# Patient Record
Sex: MALE | Race: WHITE | NOT HISPANIC OR LATINO | URBAN - METROPOLITAN AREA
[De-identification: names, ages, dates, MRNs, and addresses within clinical notes are randomized per-mention and may not be internally consistent; named-entity substitution may affect disease eponyms.]

---

## 2018-01-05 ENCOUNTER — HOSPITAL ENCOUNTER (EMERGENCY)
Facility: HOSPITAL | Age: 58
Discharge: HOME | End: 2018-01-06
Attending: EMERGENCY MEDICINE
Payer: COMMERCIAL

## 2018-01-05 ENCOUNTER — HOSPITAL ENCOUNTER (EMERGENCY)
Facility: HOSPITAL | Age: 58
Discharge: HOME | End: 2018-01-05
Attending: EMERGENCY MEDICINE
Payer: COMMERCIAL

## 2018-01-05 VITALS
SYSTOLIC BLOOD PRESSURE: 135 MMHG | HEART RATE: 114 BPM | DIASTOLIC BLOOD PRESSURE: 82 MMHG | BODY MASS INDEX: 29.4 KG/M2 | RESPIRATION RATE: 20 BRPM | OXYGEN SATURATION: 99 % | HEIGHT: 71 IN | WEIGHT: 210 LBS | TEMPERATURE: 99 F

## 2018-01-05 DIAGNOSIS — I47.10 SVT (SUPRAVENTRICULAR TACHYCARDIA): Primary | ICD-10-CM

## 2018-01-05 DIAGNOSIS — R07.9 CHEST PAIN: ICD-10-CM

## 2018-01-05 LAB
ALBUMIN SERPL BCP-MCNC: 4.1 G/DL
ALP SERPL-CCNC: 95 U/L
ALT SERPL W/O P-5'-P-CCNC: 26 U/L
ANION GAP SERPL CALC-SCNC: 13 MMOL/L
AST SERPL-CCNC: 19 U/L
BASOPHILS # BLD AUTO: 0.03 K/UL
BASOPHILS NFR BLD: 0.4 %
BILIRUB SERPL-MCNC: 0.5 MG/DL
BUN SERPL-MCNC: 14 MG/DL
CALCIUM SERPL-MCNC: 9.8 MG/DL
CHLORIDE SERPL-SCNC: 104 MMOL/L
CO2 SERPL-SCNC: 26 MMOL/L
CREAT SERPL-MCNC: 1.7 MG/DL
DIFFERENTIAL METHOD: ABNORMAL
EOSINOPHIL # BLD AUTO: 0 K/UL
EOSINOPHIL NFR BLD: 0.1 %
ERYTHROCYTE [DISTWIDTH] IN BLOOD BY AUTOMATED COUNT: 12.6 %
EST. GFR  (AFRICAN AMERICAN): 51.3 ML/MIN/1.73 M^2
EST. GFR  (NON AFRICAN AMERICAN): 44.4 ML/MIN/1.73 M^2
GLUCOSE SERPL-MCNC: 152 MG/DL
HCT VFR BLD AUTO: 50.7 %
HGB BLD-MCNC: 17 G/DL
IMM GRANULOCYTES # BLD AUTO: 0.03 K/UL
IMM GRANULOCYTES NFR BLD AUTO: 0.4 %
LYMPHOCYTES # BLD AUTO: 1.8 K/UL
LYMPHOCYTES NFR BLD: 24.4 %
MAGNESIUM SERPL-MCNC: 1.7 MG/DL
MCH RBC QN AUTO: 28.7 PG
MCHC RBC AUTO-ENTMCNC: 33.5 G/DL
MCV RBC AUTO: 86 FL
MONOCYTES # BLD AUTO: 0.6 K/UL
MONOCYTES NFR BLD: 8.6 %
NEUTROPHILS # BLD AUTO: 4.9 K/UL
NEUTROPHILS NFR BLD: 66.1 %
NRBC BLD-RTO: 0 /100 WBC
PLATELET # BLD AUTO: 245 K/UL
PMV BLD AUTO: 8.8 FL
POTASSIUM SERPL-SCNC: 4 MMOL/L
PROT SERPL-MCNC: 7.8 G/DL
RBC # BLD AUTO: 5.93 M/UL
SODIUM SERPL-SCNC: 143 MMOL/L
TROPONIN I SERPL DL<=0.01 NG/ML-MCNC: 0.01 NG/ML
WBC # BLD AUTO: 7.34 K/UL

## 2018-01-05 PROCEDURE — 99291 CRITICAL CARE FIRST HOUR: CPT | Mod: ,,, | Performed by: EMERGENCY MEDICINE

## 2018-01-05 PROCEDURE — 84484 ASSAY OF TROPONIN QUANT: CPT

## 2018-01-05 PROCEDURE — 93005 ELECTROCARDIOGRAM TRACING: CPT

## 2018-01-05 PROCEDURE — 83735 ASSAY OF MAGNESIUM: CPT

## 2018-01-05 PROCEDURE — 63600175 PHARM REV CODE 636 W HCPCS: Performed by: EMERGENCY MEDICINE

## 2018-01-05 PROCEDURE — 63600175 PHARM REV CODE 636 W HCPCS

## 2018-01-05 PROCEDURE — 85025 COMPLETE CBC W/AUTO DIFF WBC: CPT

## 2018-01-05 PROCEDURE — 96374 THER/PROPH/DIAG INJ IV PUSH: CPT

## 2018-01-05 PROCEDURE — 99283 EMERGENCY DEPT VISIT LOW MDM: CPT | Mod: 25,27

## 2018-01-05 PROCEDURE — 80053 COMPREHEN METABOLIC PANEL: CPT

## 2018-01-05 PROCEDURE — 25000003 PHARM REV CODE 250: Performed by: EMERGENCY MEDICINE

## 2018-01-05 PROCEDURE — 93010 ELECTROCARDIOGRAM REPORT: CPT | Mod: ,,, | Performed by: INTERNAL MEDICINE

## 2018-01-05 PROCEDURE — 99284 EMERGENCY DEPT VISIT MOD MDM: CPT | Mod: 25

## 2018-01-05 RX ORDER — ADENOSINE 3 MG/ML
6 INJECTION, SOLUTION INTRAVENOUS
Status: COMPLETED | OUTPATIENT
Start: 2018-01-05 | End: 2018-01-05

## 2018-01-05 RX ORDER — ADENOSINE 3 MG/ML
INJECTION, SOLUTION INTRAVENOUS
Status: DISCONTINUED
Start: 2018-01-05 | End: 2018-01-06 | Stop reason: HOSPADM

## 2018-01-05 RX ORDER — ADENOSINE 3 MG/ML
6 INJECTION, SOLUTION INTRAVENOUS
Status: DISCONTINUED | OUTPATIENT
Start: 2018-01-05 | End: 2018-01-05

## 2018-01-05 RX ORDER — ROSUVASTATIN CALCIUM 5 MG/1
5 TABLET, COATED ORAL DAILY
COMMUNITY

## 2018-01-05 RX ORDER — DILTIAZEM HYDROCHLORIDE 5 MG/ML
INJECTION INTRAVENOUS
Status: COMPLETED
Start: 2018-01-05 | End: 2018-01-05

## 2018-01-05 RX ORDER — DILTIAZEM HYDROCHLORIDE 120 MG/1
120 CAPSULE, EXTENDED RELEASE ORAL DAILY
COMMUNITY

## 2018-01-05 RX ORDER — ASPIRIN 81 MG/1
81 TABLET ORAL DAILY
Status: ON HOLD | COMMUNITY
End: 2018-01-12

## 2018-01-05 RX ORDER — ADENOSINE 3 MG/ML
INJECTION, SOLUTION INTRAVENOUS
Status: COMPLETED
Start: 2018-01-05 | End: 2018-01-05

## 2018-01-05 RX ADMIN — SODIUM CHLORIDE 1000 ML: 0.9 INJECTION, SOLUTION INTRAVENOUS at 10:01

## 2018-01-05 RX ADMIN — ADENOSINE 6 MG: 3 INJECTION, SOLUTION INTRAVENOUS at 03:01

## 2018-01-05 RX ADMIN — ADENOSINE 6 MG: 3 INJECTION, SOLUTION INTRAVENOUS at 09:01

## 2018-01-05 NOTE — ED PROVIDER NOTES
Encounter Date: 1/5/2018    SCRIBE #1 NOTE: I, Marshall Ramsay, am scribing for, and in the presence of,  Dr. Edgar. I have scribed the entire note.       History     Chief Complaint   Patient presents with    Tachycardia     Pt reports hx SVT.       Time patient was seen by the provider: 3:46PM      The patient is a 57 y.o. male with hx of: SVT, HTN and HLD  who presents to the ED with a complaint of palpitations since 2:00 PM today. When the pt came in ED, he had SVT at 230 bpm.Cardiology had offered him ablation in the past but he had declined.  The patient notes of having caffeine, chocolate, stress at home and insomnia as possible causes for the palpitations. He denies chest pain, fever, SOB, diaphoresis, nausea or vomiting. No other complaints.        The history is provided by the patient and medical records.     Review of patient's allergies indicates:   Allergen Reactions    Soma [carisoprodol]      No past medical history on file.  No past surgical history on file.  No family history on file.  Social History   Substance Use Topics    Smoking status: Not on file    Smokeless tobacco: Not on file    Alcohol use Not on file     Review of Systems   Constitutional: Negative for diaphoresis and fever.   HENT: Negative for sore throat.    Respiratory: Negative for shortness of breath.    Cardiovascular: Positive for palpitations. Negative for chest pain.   Gastrointestinal: Negative for nausea and vomiting.   Genitourinary: Negative for dysuria.   Musculoskeletal: Negative for back pain.   Skin: Negative for rash.   Neurological: Negative for weakness.   Hematological: Does not bruise/bleed easily.   Psychiatric/Behavioral: Positive for sleep disturbance.        Pt notes stress at home.       Physical Exam     Initial Vitals [01/05/18 1534]   BP Pulse Resp Temp SpO2   110/75 (!) 236 20 99.3 °F (37.4 °C) 100 %      MAP       86.67         Physical Exam    Constitutional: He appears well-developed and  well-nourished.   HENT:   Head: Normocephalic and atraumatic.   Eyes: EOM are normal. Pupils are equal, round, and reactive to light.   Neck: Normal range of motion. Neck supple. No JVD present.   Cardiovascular: Intact distal pulses. Exam reveals no gallop and no friction rub.    No murmur heard.  Tachycardia present. SVT on monitor.   Pulmonary/Chest: Breath sounds normal. No stridor. No respiratory distress. He has no wheezes. He has no rhonchi. He has no rales. He exhibits no tenderness.   Abdominal: Soft. There is no tenderness.   Musculoskeletal: Normal range of motion. He exhibits no edema or tenderness.   Lymphadenopathy:     He has no cervical adenopathy.   Neurological: He is alert and oriented to person, place, and time. He has normal strength. No cranial nerve deficit or sensory deficit.   Skin: Skin is warm and dry. Capillary refill takes less than 2 seconds. No rash noted.   Psychiatric: He has a normal mood and affect.         ED Course   Procedures  Labs Reviewed - No data to display  EKG Readings: (Independently Interpreted)   Rhythm: Sinus Tachycardia. Heart Rate: 115. Axis: Normal.   After Adenosine IV push:Normal interval; no ischemic changes.          Medical Decision Making:   History:   Old Medical Records: I decided to obtain old medical records.  Independently Interpreted Test(s):   I have ordered and independently interpreted EKG Reading(s) - see prior notes  Clinical Tests:   Lab Tests: Ordered and Reviewed  Radiological Study: Ordered and Reviewed  Medical Tests: Ordered and Reviewed  ED Management:  3:55 PM  Tried carotid massage: did not work.  Tried vagal manuever x2;did not work.    Gave him 6mg adenosine iv push, broke into sinus rhythm.            Scribe Attestation:   Scribe #1: I performed the above scribed service and the documentation accurately describes the services I performed. I attest to the accuracy of the note.    Attending Attestation:         Attending Critical Care:    Critical Care Times:   ==============================================================  · Total Critical Care Time - exclusive of procedural time: 35 minutes.  ==============================================================              ED Course      Clinical Impression:   Diagnoses of Chest pain and Chest pain were pertinent to this visit.    Disposition:   Disposition: Discharged  57-year-old white male with history of SVT seen by cardiology was told to have an ablation refused it has had upper respiratory cold over the last few days has been using nasal decongestant sprays and decongestant cough medicines drinking caffeine alcohol being chocolate been under stress and not sleeping well certainly palpitations this morning presented to the ER with palpitations heart rate of 236 SVT blood pressure 110/70 500% on room air respiratory rate of 18 temperature 99.3 lungs clear to auscultation bilaterally heart tachycardic SVT on the monitor.  Carotid massage was attempted no effect vagal maneuvers were attempted twice no effect patient was placed on 2 L O2 nasal cannula and IV was started blood was drawn for labs placed on cardiac monitor and O2 sat monitor patient is given 6 mg of adenosine IV push which converted him immediately to a sinus rhythm 12-lead EKG was done which showed no acute ischemic changes with a sinus tachycardia at 105 CBC normal compress metabolic panel normal magnesium normal chest x-ray process per radiologist N O'Xiomy in the lab was 1421.7.  Patient was observed for a period time in the ER no chest pain no palpitations maintained in a sinus rhythm throughout fosinopril this time diagnosis SVT asked him to stop the nasal decongestant spray.  The cough syrup with a decongestant stop caffeine stop alcohol stop chocolate follow-up with cardiology and his primary care doctor as soon as possible return to the ER for chest pain shortness of breath nausea vomiting diaphoresis or  palpitations                        Huan Edgar MD  01/05/18 7960

## 2018-01-05 NOTE — ED TRIAGE NOTES
Patient has a history of SVT.  Patient states he felt palpitations. Denies chest pain. Started at 2pm

## 2018-01-06 VITALS
HEART RATE: 97 BPM | DIASTOLIC BLOOD PRESSURE: 70 MMHG | SYSTOLIC BLOOD PRESSURE: 115 MMHG | OXYGEN SATURATION: 97 % | TEMPERATURE: 100 F | RESPIRATION RATE: 12 BRPM | HEIGHT: 71 IN | BODY MASS INDEX: 29.4 KG/M2 | WEIGHT: 210 LBS

## 2018-01-06 PROCEDURE — 25000003 PHARM REV CODE 250: Performed by: EMERGENCY MEDICINE

## 2018-01-06 RX ORDER — METOPROLOL SUCCINATE 25 MG/1
12.5 TABLET, EXTENDED RELEASE ORAL DAILY
Qty: 15 TABLET | Refills: 0 | Status: ON HOLD | OUTPATIENT
Start: 2018-01-06 | End: 2018-01-12 | Stop reason: HOSPADM

## 2018-01-06 RX ADMIN — METOPROLOL SUCCINATE 12.5 MG: 25 TABLET, EXTENDED RELEASE ORAL at 01:01

## 2018-01-10 ENCOUNTER — HOSPITAL ENCOUNTER (OUTPATIENT)
Facility: HOSPITAL | Age: 58
Discharge: HOME | End: 2018-01-12
Attending: EMERGENCY MEDICINE | Admitting: HOSPITALIST
Payer: COMMERCIAL

## 2018-01-10 DIAGNOSIS — I10 ESSENTIAL HYPERTENSION: ICD-10-CM

## 2018-01-10 DIAGNOSIS — N17.9 ACUTE RENAL FAILURE, UNSPECIFIED ACUTE RENAL FAILURE TYPE: ICD-10-CM

## 2018-01-10 DIAGNOSIS — N17.9 ACUTE KIDNEY FAILURE: ICD-10-CM

## 2018-01-10 DIAGNOSIS — R79.89 ELEVATED TROPONIN I LEVEL: ICD-10-CM

## 2018-01-10 DIAGNOSIS — I47.10 SVT (SUPRAVENTRICULAR TACHYCARDIA): Primary | ICD-10-CM

## 2018-01-10 DIAGNOSIS — E78.2 MIXED HYPERLIPIDEMIA: Chronic | ICD-10-CM

## 2018-01-10 DIAGNOSIS — R00.0 TACHYCARDIA: ICD-10-CM

## 2018-01-10 DIAGNOSIS — F41.1 GENERALIZED ANXIETY DISORDER: Chronic | ICD-10-CM

## 2018-01-10 LAB
ALBUMIN SERPL BCP-MCNC: 4.1 G/DL
ALP SERPL-CCNC: 111 U/L
ALT SERPL W/O P-5'-P-CCNC: 64 U/L
ANION GAP SERPL CALC-SCNC: 12 MMOL/L
AST SERPL-CCNC: 32 U/L
BASOPHILS # BLD AUTO: 0.06 K/UL
BASOPHILS NFR BLD: 0.7 %
BILIRUB SERPL-MCNC: 0.6 MG/DL
BUN SERPL-MCNC: 20 MG/DL
CALCIUM SERPL-MCNC: 10.1 MG/DL
CHLORIDE SERPL-SCNC: 102 MMOL/L
CO2 SERPL-SCNC: 29 MMOL/L
CREAT SERPL-MCNC: 1.5 MG/DL
DIFFERENTIAL METHOD: ABNORMAL
EOSINOPHIL # BLD AUTO: 0.2 K/UL
EOSINOPHIL NFR BLD: 2.1 %
ERYTHROCYTE [DISTWIDTH] IN BLOOD BY AUTOMATED COUNT: 12.1 %
EST. GFR  (AFRICAN AMERICAN): 59.7 ML/MIN/1.73 M^2
EST. GFR  (NON AFRICAN AMERICAN): 51.7 ML/MIN/1.73 M^2
GLUCOSE SERPL-MCNC: 185 MG/DL
HCT VFR BLD AUTO: 52.7 %
HGB BLD-MCNC: 17.6 G/DL
IMM GRANULOCYTES # BLD AUTO: 0.04 K/UL
IMM GRANULOCYTES NFR BLD AUTO: 0.4 %
INR PPP: 1.1
LYMPHOCYTES # BLD AUTO: 3.3 K/UL
LYMPHOCYTES NFR BLD: 36.3 %
MAGNESIUM SERPL-MCNC: 2.1 MG/DL
MCH RBC QN AUTO: 27.8 PG
MCHC RBC AUTO-ENTMCNC: 33.4 G/DL
MCV RBC AUTO: 83 FL
MONOCYTES # BLD AUTO: 0.6 K/UL
MONOCYTES NFR BLD: 6.7 %
NEUTROPHILS # BLD AUTO: 4.9 K/UL
NEUTROPHILS NFR BLD: 53.8 %
NRBC BLD-RTO: 0 /100 WBC
PLATELET # BLD AUTO: 328 K/UL
PMV BLD AUTO: 9.3 FL
POTASSIUM SERPL-SCNC: 3.8 MMOL/L
PROT SERPL-MCNC: 8 G/DL
PROTHROMBIN TIME: 11.4 SEC
RBC # BLD AUTO: 6.34 M/UL
SODIUM SERPL-SCNC: 143 MMOL/L
TROPONIN I SERPL DL<=0.01 NG/ML-MCNC: 0.09 NG/ML
TROPONIN I SERPL DL<=0.01 NG/ML-MCNC: 0.09 NG/ML
TSH SERPL DL<=0.005 MIU/L-ACNC: 2.55 UIU/ML
WBC # BLD AUTO: 9.1 K/UL

## 2018-01-10 PROCEDURE — 96361 HYDRATE IV INFUSION ADD-ON: CPT

## 2018-01-10 PROCEDURE — 85610 PROTHROMBIN TIME: CPT

## 2018-01-10 PROCEDURE — 99285 EMERGENCY DEPT VISIT HI MDM: CPT | Mod: 25

## 2018-01-10 PROCEDURE — 99244 OFF/OP CNSLTJ NEW/EST MOD 40: CPT | Mod: ,,, | Performed by: INTERNAL MEDICINE

## 2018-01-10 PROCEDURE — 93010 ELECTROCARDIOGRAM REPORT: CPT | Mod: 76,,, | Performed by: INTERNAL MEDICINE

## 2018-01-10 PROCEDURE — 25000003 PHARM REV CODE 250: Performed by: EMERGENCY MEDICINE

## 2018-01-10 PROCEDURE — 84484 ASSAY OF TROPONIN QUANT: CPT

## 2018-01-10 PROCEDURE — 83735 ASSAY OF MAGNESIUM: CPT

## 2018-01-10 PROCEDURE — 84484 ASSAY OF TROPONIN QUANT: CPT | Mod: 91

## 2018-01-10 PROCEDURE — 80053 COMPREHEN METABOLIC PANEL: CPT

## 2018-01-10 PROCEDURE — 96375 TX/PRO/DX INJ NEW DRUG ADDON: CPT

## 2018-01-10 PROCEDURE — 93005 ELECTROCARDIOGRAM TRACING: CPT

## 2018-01-10 PROCEDURE — 99285 EMERGENCY DEPT VISIT HI MDM: CPT | Mod: ,,, | Performed by: EMERGENCY MEDICINE

## 2018-01-10 PROCEDURE — 84443 ASSAY THYROID STIM HORMONE: CPT

## 2018-01-10 PROCEDURE — 99220 PR INITIAL OBSERVATION CARE,LEVL III: CPT | Mod: ,,, | Performed by: NURSE PRACTITIONER

## 2018-01-10 PROCEDURE — 63600175 PHARM REV CODE 636 W HCPCS: Performed by: EMERGENCY MEDICINE

## 2018-01-10 PROCEDURE — 85025 COMPLETE CBC W/AUTO DIFF WBC: CPT

## 2018-01-10 PROCEDURE — 25000003 PHARM REV CODE 250: Performed by: NURSE PRACTITIONER

## 2018-01-10 PROCEDURE — 63600175 PHARM REV CODE 636 W HCPCS

## 2018-01-10 PROCEDURE — 96365 THER/PROPH/DIAG IV INF INIT: CPT

## 2018-01-10 PROCEDURE — 96366 THER/PROPH/DIAG IV INF ADDON: CPT

## 2018-01-10 PROCEDURE — G0378 HOSPITAL OBSERVATION PER HR: HCPCS

## 2018-01-10 PROCEDURE — 93010 ELECTROCARDIOGRAM REPORT: CPT | Mod: ,,, | Performed by: INTERNAL MEDICINE

## 2018-01-10 RX ORDER — ROSUVASTATIN CALCIUM 5 MG/1
5 TABLET, COATED ORAL DAILY
Status: DISCONTINUED | OUTPATIENT
Start: 2018-01-11 | End: 2018-01-12 | Stop reason: HOSPADM

## 2018-01-10 RX ORDER — CHOLECALCIFEROL (VITAMIN D3) 25 MCG
5000 TABLET ORAL EVERY OTHER DAY
Status: DISCONTINUED | OUTPATIENT
Start: 2018-01-11 | End: 2018-01-12 | Stop reason: HOSPADM

## 2018-01-10 RX ORDER — CHOLECALCIFEROL (VITAMIN D3) 25 MCG
5000 TABLET ORAL EVERY OTHER DAY
COMMUNITY

## 2018-01-10 RX ORDER — ADENOSINE 3 MG/ML
INJECTION, SOLUTION INTRAVENOUS
Status: DISPENSED
Start: 2018-01-10 | End: 2018-01-10

## 2018-01-10 RX ORDER — ASPIRIN 81 MG/1
81 TABLET ORAL DAILY
Status: DISCONTINUED | OUTPATIENT
Start: 2018-01-11 | End: 2018-01-12 | Stop reason: HOSPADM

## 2018-01-10 RX ORDER — ASPIRIN 325 MG
325 TABLET ORAL
Status: COMPLETED | OUTPATIENT
Start: 2018-01-10 | End: 2018-01-10

## 2018-01-10 RX ORDER — PROMETHAZINE HYDROCHLORIDE 25 MG/1
25 TABLET ORAL DAILY PRN
COMMUNITY

## 2018-01-10 RX ORDER — ADENOSINE 3 MG/ML
12 INJECTION, SOLUTION INTRAVENOUS
Status: COMPLETED | OUTPATIENT
Start: 2018-01-10 | End: 2018-01-10

## 2018-01-10 RX ORDER — ONDANSETRON 2 MG/ML
4 INJECTION INTRAMUSCULAR; INTRAVENOUS
Status: COMPLETED | OUTPATIENT
Start: 2018-01-10 | End: 2018-01-10

## 2018-01-10 RX ORDER — SIMETHICONE 80 MG
80 TABLET,CHEWABLE ORAL EVERY 6 HOURS PRN
Status: DISCONTINUED | OUTPATIENT
Start: 2018-01-10 | End: 2018-01-12 | Stop reason: HOSPADM

## 2018-01-10 RX ORDER — ONDANSETRON 2 MG/ML
INJECTION INTRAMUSCULAR; INTRAVENOUS
Status: DISPENSED
Start: 2018-01-10 | End: 2018-01-10

## 2018-01-10 RX ORDER — DIAZEPAM 5 MG/1
2.5 TABLET ORAL DAILY PRN
COMMUNITY

## 2018-01-10 RX ORDER — ADENOSINE 3 MG/ML
6 INJECTION, SOLUTION INTRAVENOUS
Status: DISCONTINUED | OUTPATIENT
Start: 2018-01-10 | End: 2018-01-10

## 2018-01-10 RX ORDER — METOPROLOL TARTRATE 25 MG/1
25 TABLET, FILM COATED ORAL 2 TIMES DAILY
Status: DISCONTINUED | OUTPATIENT
Start: 2018-01-10 | End: 2018-01-10

## 2018-01-10 RX ORDER — ADENOSINE 3 MG/ML
INJECTION, SOLUTION INTRAVENOUS
Status: COMPLETED
Start: 2018-01-10 | End: 2018-01-10

## 2018-01-10 RX ORDER — SODIUM CHLORIDE 9 MG/ML
1000 INJECTION, SOLUTION INTRAVENOUS
Status: COMPLETED | OUTPATIENT
Start: 2018-01-10 | End: 2018-01-10

## 2018-01-10 RX ORDER — SIMETHICONE 125 MG
125 TABLET,CHEWABLE ORAL EVERY 6 HOURS PRN
COMMUNITY

## 2018-01-10 RX ORDER — ACETAMINOPHEN 325 MG/1
650 TABLET ORAL EVERY 4 HOURS PRN
Status: DISCONTINUED | OUTPATIENT
Start: 2018-01-10 | End: 2018-01-12 | Stop reason: HOSPADM

## 2018-01-10 RX ORDER — METOPROLOL TARTRATE 25 MG/1
12.5 TABLET ORAL
Status: COMPLETED | OUTPATIENT
Start: 2018-01-10 | End: 2018-01-10

## 2018-01-10 RX ORDER — MAGNESIUM SULFATE HEPTAHYDRATE 40 MG/ML
2 INJECTION, SOLUTION INTRAVENOUS ONCE
Status: COMPLETED | OUTPATIENT
Start: 2018-01-10 | End: 2018-01-10

## 2018-01-10 RX ORDER — DILTIAZEM HYDROCHLORIDE 120 MG/1
120 CAPSULE, COATED, EXTENDED RELEASE ORAL
Status: COMPLETED | OUTPATIENT
Start: 2018-01-10 | End: 2018-01-10

## 2018-01-10 RX ORDER — DILTIAZEM HYDROCHLORIDE 120 MG/1
120 CAPSULE, COATED, EXTENDED RELEASE ORAL DAILY
Status: DISCONTINUED | OUTPATIENT
Start: 2018-01-11 | End: 2018-01-12 | Stop reason: HOSPADM

## 2018-01-10 RX ORDER — CETIRIZINE HYDROCHLORIDE 5 MG/1
10 TABLET ORAL DAILY
Status: DISCONTINUED | OUTPATIENT
Start: 2018-01-11 | End: 2018-01-12 | Stop reason: HOSPADM

## 2018-01-10 RX ORDER — ADENOSINE 3 MG/ML
12 INJECTION, SOLUTION INTRAVENOUS
Status: DISCONTINUED | OUTPATIENT
Start: 2018-01-10 | End: 2018-01-10

## 2018-01-10 RX ORDER — METOPROLOL SUCCINATE 25 MG/1
25 TABLET, EXTENDED RELEASE ORAL 2 TIMES DAILY
Status: DISCONTINUED | OUTPATIENT
Start: 2018-01-10 | End: 2018-01-11

## 2018-01-10 RX ORDER — SODIUM CHLORIDE 0.9 % (FLUSH) 0.9 %
5 SYRINGE (ML) INJECTION
Status: DISCONTINUED | OUTPATIENT
Start: 2018-01-10 | End: 2018-01-12 | Stop reason: HOSPADM

## 2018-01-10 RX ORDER — ADENOSINE 3 MG/ML
6 INJECTION, SOLUTION INTRAVENOUS
Status: COMPLETED | OUTPATIENT
Start: 2018-01-10 | End: 2018-01-10

## 2018-01-10 RX ADMIN — MAGNESIUM SULFATE IN WATER 2 G: 40 INJECTION, SOLUTION INTRAVENOUS at 02:01

## 2018-01-10 RX ADMIN — SODIUM CHLORIDE 1000 ML: 0.9 INJECTION, SOLUTION INTRAVENOUS at 11:01

## 2018-01-10 RX ADMIN — ADENOSINE 6 MG: 3 INJECTION, SOLUTION INTRAVENOUS at 10:01

## 2018-01-10 RX ADMIN — ASPIRIN 325 MG ORAL TABLET 325 MG: 325 PILL ORAL at 11:01

## 2018-01-10 RX ADMIN — METOPROLOL SUCCINATE 25 MG: 25 TABLET, EXTENDED RELEASE ORAL at 08:01

## 2018-01-10 RX ADMIN — Medication 12.5 MG: at 11:01

## 2018-01-10 RX ADMIN — DILTIAZEM HYDROCHLORIDE 120 MG: 120 CAPSULE, COATED, EXTENDED RELEASE ORAL at 11:01

## 2018-01-10 RX ADMIN — ONDANSETRON 4 MG: 2 INJECTION INTRAMUSCULAR; INTRAVENOUS at 11:01

## 2018-01-10 RX ADMIN — ADENOSINE 12 MG: 3 INJECTION, SOLUTION INTRAVENOUS at 10:01

## 2018-01-10 NOTE — ED TRIAGE NOTES
Patient received with complaint of palpitations after waking this morning at 0900.  Denies nausea, vomiting, SOB.  States he just felt his heart racing.  Some dizziness at times.    No LDA's in place on arrival to department.    Family present.    Pain:  Denies pain.      Psychosocial:  Patient is calm and cooperative.  Patients insight and judgement are appropriate to situation.  Appears clean, well maintained, with clothing appropriate to environment.  No evidence of delusions, hallucinations, or psychosis.    Neuro:  Eyes open spontaneously.  Awake, alert, oriented x 4.  Speech clear and appropriate.  Tolerating saliva secretions well.  Able to follow commands, demonstrating ability to actively and appropriately communicate within context of current conversation.  Symmetrical facial muscles.  Moving all extremities well with no noted weakness.  Adequate muscle tone present.    Movement in purposeful.       Airway:  Bilateral chest rise and fall.  RR regular and non-labored.  No crepitus or subcutaneous emphysema noted on palpation.      Circulatory:  Skin warm, dry, and pink.  Apical and radial pulses strong and regular.  Capillary refill/skin blanching less than 3 seconds to distal of 4 extremities.  SVT on the CM with rate of 200.      Abdomen:  Abdomen soft and non-distended.        Urinary:  Patient reports routine urination without pain, frequency, or urgency.  Voids independently.  Reports urine appears chayo/yellow in color.    Extremities:  No redness, heat, swelling, deformity, or pain.    Skin:  Intact with no bruising/discolorations noted.

## 2018-01-10 NOTE — ED NOTES
Bed: 04  Expected date: 1/10/18  Expected time: 9:15 AM  Means of arrival:   Comments:  Head Bleed

## 2018-01-10 NOTE — SUBJECTIVE & OBJECTIVE
Past Medical History:   Diagnosis Date    Anxiety     Hyperlipidemia     Hypertension     SVT (supraventricular tachycardia)        History reviewed. No pertinent surgical history.    Review of patient's allergies indicates:   Allergen Reactions    Soma [carisoprodol]        No current facility-administered medications on file prior to encounter.      Current Outpatient Prescriptions on File Prior to Encounter   Medication Sig    aspirin (ECOTRIN) 81 MG EC tablet Take 81 mg by mouth once daily.    diltiaZEM (DILACOR XR) 120 MG CDCR Take 120 mg by mouth once daily.    metoprolol succinate (TOPROL XL) 25 MG 24 hr tablet Take 0.5 tablets (12.5 mg total) by mouth once daily. (Patient taking differently: Take 25 mg by mouth 2 (two) times daily. )    rosuvastatin (CRESTOR) 5 MG tablet Take 5 mg by mouth once daily. Taking every other day     Family History     None        Social History Main Topics    Smoking status: Never Smoker    Smokeless tobacco: Not on file    Alcohol use No    Drug use: No    Sexual activity: Not on file     Review of Systems   Constitution: Positive for malaise/fatigue. Negative for chills and fever.   HENT: Negative for hoarse voice and sore throat.    Eyes: Negative for pain and redness.   Cardiovascular: Positive for irregular heartbeat and palpitations. Negative for chest pain, dyspnea on exertion, leg swelling and orthopnea.   Respiratory: Negative for cough and shortness of breath.    Endocrine: Negative for polydipsia and polyuria.   Hematologic/Lymphatic: Negative for bleeding problem. Does not bruise/bleed easily.   Skin: Negative for flushing and itching.   Musculoskeletal: Negative for joint pain and joint swelling.   Gastrointestinal: Negative for hematemesis, hematochezia and melena.   Genitourinary: Negative for dysuria and hematuria.   Neurological: Negative for light-headedness and loss of balance.     Objective:     Vital Signs (Most Recent):  Temp: 98 °F (36.7 °C)  (01/10/18 1043)  Pulse: 76 (01/10/18 1547)  Resp: 17 (01/10/18 1100)  BP: 137/73 (01/10/18 1547)  SpO2: 99 % (01/10/18 1547) Vital Signs (24h Range):  Temp:  [98 °F (36.7 °C)] 98 °F (36.7 °C)  Pulse:  [] 76  Resp:  [17-21] 17  SpO2:  [98 %-100 %] 99 %  BP: ()/() 137/73     Weight: 90.7 kg (200 lb)  Body mass index is 27.12 kg/m².    SpO2: 99 %       No intake or output data in the 24 hours ending 01/10/18 1619    Lines/Drains/Airways     Peripheral Intravenous Line                 Peripheral IV - Single Lumen 01/10/18 1055 Left Antecubital less than 1 day                Physical Exam   Constitutional: He is oriented to person, place, and time. He appears well-developed and well-nourished.   HENT:   Head: Normocephalic and atraumatic.   Mouth/Throat: No oropharyngeal exudate.   Eyes: EOM are normal. Pupils are equal, round, and reactive to light.   Neck: Normal range of motion. Neck supple. No JVD present.   Cardiovascular: Normal rate and regular rhythm.  Exam reveals no friction rub.    No murmur heard.  Pulmonary/Chest: Effort normal and breath sounds normal. No respiratory distress.   Abdominal: Soft. Bowel sounds are normal. He exhibits no distension.   Musculoskeletal: Normal range of motion. He exhibits no edema.   Neurological: He is alert and oriented to person, place, and time.   Skin: No rash noted.       Significant Labs: All pertinent lab results from the last 24 hours have been reviewed.    Significant Imaging: EKG: short RP tahcycardia likley Typical AVNRT

## 2018-01-10 NOTE — ED PROVIDER NOTES
Encounter Date: 1/10/2018    SCRIBE #1 NOTE: I, Lizet Mary, am scribing for, and in the presence of, Dr. Winslow.       History     Chief Complaint   Patient presents with    Tachycardia     hx svt     Time patient was seen by the provider: 10:54 AM      This is a 57 y.o. male with co-morbidities including SVT, HTN, HLD and anxiety who presents to the ED with a chief complaint of acute onset of tachycardia since at 9:00 AM this morning. Heart rate is 200 at the current time. Patient states the current episodes feel similar to past episodes of SVT. Additionally endorses shortness of breath, dizziness, pallor. Denies chest pain. He reportedly tried the Valsalva Maneuver at home, no relief.       The history is provided by the patient and medical records.     Review of patient's allergies indicates:   Allergen Reactions    Soma [carisoprodol]      Past Medical History:   Diagnosis Date    Anxiety     Hyperlipidemia     Hypertension     SVT (supraventricular tachycardia)      History reviewed. No pertinent surgical history.  History reviewed. No pertinent family history.  Social History   Substance Use Topics    Smoking status: Never Smoker    Smokeless tobacco: Not on file    Alcohol use No     Review of Systems   Constitutional: Negative for chills, diaphoresis and fever.   HENT: Negative for nosebleeds.    Eyes: Negative for photophobia and visual disturbance.   Respiratory: Positive for shortness of breath. Negative for cough.    Cardiovascular: Positive for palpitations. Negative for chest pain.   Gastrointestinal: Negative for abdominal pain, diarrhea, nausea and vomiting.   Musculoskeletal: Negative for back pain and neck pain.   Skin: Positive for pallor. Negative for rash.   Neurological: Positive for dizziness.   Psychiatric/Behavioral: Negative for behavioral problems.       Physical Exam     Initial Vitals [01/10/18 1043]   BP Pulse Resp Temp SpO2   (!) 201/130 (!) 200 18 98 °F (36.7 °C) 99 %       MAP       153.67         Physical Exam    Nursing note and vitals reviewed.  Constitutional: He appears well-developed and well-nourished. He is not diaphoretic. No distress.   HENT:   Head: Normocephalic and atraumatic.   Eyes: Conjunctivae and EOM are normal. Pupils are equal, round, and reactive to light.   Neck: Normal range of motion. Neck supple. No JVD present.   Cardiovascular: Regular rhythm. Tachycardia present.    No murmur heard.  Pulmonary/Chest: Breath sounds normal. No respiratory distress. He has no wheezes. He has no rhonchi. He has no rales.   Abdominal: Soft. Bowel sounds are normal. He exhibits no distension and no mass. There is no tenderness. There is no rebound and no guarding.   Musculoskeletal: Normal range of motion. He exhibits no edema or tenderness.   Neurological: He is alert and oriented to person, place, and time. He has normal strength. No cranial nerve deficit or sensory deficit.   Skin: Skin is warm and dry. No rash noted. There is pallor.   Psychiatric: He has a normal mood and affect.         ED Course   Procedures  Labs Reviewed   COMPREHENSIVE METABOLIC PANEL - Abnormal; Notable for the following:        Result Value    Glucose 185 (*)     Creatinine 1.5 (*)     ALT 64 (*)     eGFR if  59.7 (*)     eGFR if non  51.7 (*)     All other components within normal limits   TROPONIN I - Abnormal; Notable for the following:     Troponin I 0.085 (*)     All other components within normal limits   CBC W/ AUTO DIFFERENTIAL - Abnormal; Notable for the following:     RBC 6.34 (*)     All other components within normal limits   PROTIME-INR   MAGNESIUM   TSH   MAGNESIUM    Narrative:     [facility] #[id number]; TSH # [id number] PER MARICRUZ BARAHONA MD 15:02    01/10/2018    TSH    Narrative:     [facility] #[id number]; TSH # [id number] PER MARICRUZ BARAHONA MD 15:02    01/10/2018    TROPONIN I        X-Ray Chest 1 View   Final Result      No acute disease  identified in this 57-year-old man with chest pain.         Electronically signed by: Shelley Cameron MD   Date:     01/10/18   Time:    13:59             Medical Decision Making:   History:   Old Medical Records: I decided to obtain old medical records.  Clinical Tests:   Lab Tests: Ordered and Reviewed  Radiological Study: Ordered and Reviewed  Medical Tests: Ordered and Reviewed  ED Management:  Patient experienced brief episode of ventricular tachycardia. Denied chest pain, felt palpitations. Resolved spontaneously. Cardiology consulted, informed.     Discussion with Cardiology. Patient will be admitted to observation. There is consideration of ablation procedure. Patient will disc with insurance. Currently, he's asymptomatic and has no complaints.   Other:   I have discussed this case with another health care provider.            Scribe Attestation:   Scribe #1: I performed the above scribed service and the documentation accurately describes the services I performed. I attest to the accuracy of the note.            ED Course      Clinical Impression:   The primary encounter diagnosis was SVT (supraventricular tachycardia). Diagnoses of Tachycardia, Elevated troponin I level, and SVT (supraventricular tachycardia) were also pertinent to this visit.    Disposition:   Disposition: Placed in Observation                        Edwardo Winslow MD  01/10/18 1629       Edwardo Winslow MD  01/11/18 0832

## 2018-01-10 NOTE — HPI
57 y.o. male with co-morbidities including SVT, HTN, HLD and anxiety who presents to the ED with a chief complaint of acute onset of tachycardia since at 9:00 AM this morning. Heart rate is 200 at the current time. Patient states the current episodes feel similar to past episodes of SVT. Additionally endorses shortness of breath, dizziness, pallor. Denies chest pain. He reportedly tried the Valsalva Maneuver at home, no relief.

## 2018-01-10 NOTE — HPI
58 y/o male with hx of SVT for 3 years occuring about once a month(lives in [place] and offered ablation in the past) here visiting family presents with 3rd episode of SVT since 1/5. On 1/5 and 1/6 he came to ED with SVT in 200's dizziness and hypotension and converted with 6mg Adenosine each time. Sent out on 12.5mg Toprol BID in addition to Home dilt 120mg XR he has been taking for some time. He was having URI symptoms and taking cold medications which were thought to have precipitated event so he stopped taking them but had another episode today. 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 SBP was in 70's with HR in 200's and he spilled some troponin. No CP. Very active and doesn't get CP or SOB when playing soccer or softball.

## 2018-01-10 NOTE — CONSULTS
[facility]  Cardiology  Consult Note    Patient Name: Reyna Drummond  MRN: [id number]  Admission Date: 1/10/2018  Hospital Length of Stay: 0 days  Code Status: Full Code   Attending Provider: Ella Nobles MD   Consulting Provider: David Scott MD  Primary Care Physician: No primary care provider on file.  Principal Problem:<principal problem not specified>    Patient information was obtained from patient and ER records.     Inpatient consult to Electrophysiology  Consult performed by: DAVID SCOTT  Consult ordered by: MARLENE GHOTRA        Subjective:     Chief Complaint:  Tachycardia     HPI:   58 y/o male with hx of SVT for 3 years occuring about once a month(lives in [place] and offered ablation in the past) here visiting family presents with 3rd episode of SVT since 1/5. On 1/5 and 1/6 he came to ED with SVT in 200's dizziness and hypotension and converted with 6mg Adenosine each time. Sent out on 12.5mg Toprol BID in addition to Home dilt 120mg XR he has been taking for some time. He was having URI symptoms and taking cold medications which were thought to have precipitated event so he stopped taking them but had another episode today. 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 SBP was in 70's with HR in 200's and he spilled some troponin. No CP. Very active and doesn't get CP or SOB when playing soccer or softball.    Past Medical History:   Diagnosis Date    Anxiety     Hyperlipidemia     Hypertension     SVT (supraventricular tachycardia)        History reviewed. No pertinent surgical history.    Review of patient's allergies indicates:   Allergen Reactions    Soma [carisoprodol]        No current facility-administered medications on file prior to encounter.      Current Outpatient Prescriptions on File Prior to Encounter   Medication Sig    aspirin (ECOTRIN) 81 MG EC tablet Take 81 mg by mouth once daily.    diltiaZEM  (DILACOR XR) 120 MG CDCR Take 120 mg by mouth once daily.    metoprolol succinate (TOPROL XL) 25 MG 24 hr tablet Take 0.5 tablets (12.5 mg total) by mouth once daily. (Patient taking differently: Take 25 mg by mouth 2 (two) times daily. )    rosuvastatin (CRESTOR) 5 MG tablet Take 5 mg by mouth once daily. Taking every other day     Family History     None        Social History Main Topics    Smoking status: Never Smoker    Smokeless tobacco: Not on file    Alcohol use No    Drug use: No    Sexual activity: Not on file     Review of Systems   Constitution: Positive for malaise/fatigue. Negative for chills and fever.   HENT: Negative for hoarse voice and sore throat.    Eyes: Negative for pain and redness.   Cardiovascular: Positive for irregular heartbeat and palpitations. Negative for chest pain, dyspnea on exertion, leg swelling and orthopnea.   Respiratory: Negative for cough and shortness of breath.    Endocrine: Negative for polydipsia and polyuria.   Hematologic/Lymphatic: Negative for bleeding problem. Does not bruise/bleed easily.   Skin: Negative for flushing and itching.   Musculoskeletal: Negative for joint pain and joint swelling.   Gastrointestinal: Negative for hematemesis, hematochezia and melena.   Genitourinary: Negative for dysuria and hematuria.   Neurological: Negative for light-headedness and loss of balance.     Objective:     Vital Signs (Most Recent):  Temp: 98 °F (36.7 °C) (01/10/18 1043)  Pulse: 76 (01/10/18 1547)  Resp: 17 (01/10/18 1100)  BP: 137/73 (01/10/18 1547)  SpO2: 99 % (01/10/18 1547) Vital Signs (24h Range):  Temp:  [98 °F (36.7 °C)] 98 °F (36.7 °C)  Pulse:  [] 76  Resp:  [17-21] 17  SpO2:  [98 %-100 %] 99 %  BP: ()/() 137/73     Weight: 90.7 kg (200 lb)  Body mass index is 27.12 kg/m².    SpO2: 99 %       No intake or output data in the 24 hours ending 01/10/18 1619    Lines/Drains/Airways     Peripheral Intravenous Line                 Peripheral IV -  Single Lumen 01/10/18 1055 Left Antecubital less than 1 day                Physical Exam   Constitutional: He is oriented to person, place, and time. He appears well-developed and well-nourished.   HENT:   Head: Normocephalic and atraumatic.   Mouth/Throat: No oropharyngeal exudate.   Eyes: EOM are normal. Pupils are equal, round, and reactive to light.   Neck: Normal range of motion. Neck supple. No JVD present.   Cardiovascular: Normal rate and regular rhythm.  Exam reveals no friction rub.    No murmur heard.  Pulmonary/Chest: Effort normal and breath sounds normal. No respiratory distress.   Abdominal: Soft. Bowel sounds are normal. He exhibits no distension.   Musculoskeletal: Normal range of motion. He exhibits no edema.   Neurological: He is alert and oriented to person, place, and time.   Skin: No rash noted.       Significant Labs: All pertinent lab results from the last 24 hours have been reviewed.    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 I will sign off. Please contact us if you have any additional questions.    David Scott MD  Cardiology   [facility]

## 2018-01-10 NOTE — ASSESSMENT & PLAN NOTE
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

## 2018-01-11 ENCOUNTER — ANESTHESIA (OUTPATIENT)
Dept: MEDSURG UNIT | Facility: HOSPITAL | Age: 58
End: 2018-01-11
Payer: COMMERCIAL

## 2018-01-11 ENCOUNTER — ANESTHESIA EVENT (OUTPATIENT)
Dept: MEDSURG UNIT | Facility: HOSPITAL | Age: 58
End: 2018-01-11
Payer: COMMERCIAL

## 2018-01-11 LAB
ANION GAP SERPL CALC-SCNC: 8 MMOL/L
BUN SERPL-MCNC: 15 MG/DL
CALCIUM SERPL-MCNC: 8.7 MG/DL
CHLORIDE SERPL-SCNC: 111 MMOL/L
CHOLEST SERPL-MCNC: 122 MG/DL
CHOLEST/HDLC SERPL: 5.1 {RATIO}
CO2 SERPL-SCNC: 23 MMOL/L
CREAT SERPL-MCNC: 1 MG/DL
DIASTOLIC DYSFUNCTION: NO
EST. GFR  (AFRICAN AMERICAN): >60 ML/MIN/1.73 M^2
EST. GFR  (NON AFRICAN AMERICAN): >60 ML/MIN/1.73 M^2
GLUCOSE SERPL-MCNC: 99 MG/DL
HDLC SERPL-MCNC: 24 MG/DL
HDLC SERPL: 19.7 %
LDLC SERPL CALC-MCNC: 77.8 MG/DL
MAGNESIUM SERPL-MCNC: 2 MG/DL
NONHDLC SERPL-MCNC: 98 MG/DL
PHOSPHATE SERPL-MCNC: 3.5 MG/DL
POTASSIUM SERPL-SCNC: 4.3 MMOL/L
RETIRED EF AND QEF - SEE NOTES: 65 (ref 55–65)
SODIUM SERPL-SCNC: 142 MMOL/L
TRIGL SERPL-MCNC: 101 MG/DL
TROPONIN I SERPL DL<=0.01 NG/ML-MCNC: 0.07 NG/ML
TROPONIN I SERPL DL<=0.01 NG/ML-MCNC: 0.07 NG/ML

## 2018-01-11 PROCEDURE — C1730 CATH, EP, 19 OR FEW ELECT: HCPCS

## 2018-01-11 PROCEDURE — 80061 LIPID PANEL: CPT

## 2018-01-11 PROCEDURE — 63600175 PHARM REV CODE 636 W HCPCS

## 2018-01-11 PROCEDURE — 93010 ELECTROCARDIOGRAM REPORT: CPT | Mod: ,,, | Performed by: INTERNAL MEDICINE

## 2018-01-11 PROCEDURE — 99152 MOD SED SAME PHYS/QHP 5/>YRS: CPT | Mod: ,,, | Performed by: INTERNAL MEDICINE

## 2018-01-11 PROCEDURE — 25000003 PHARM REV CODE 250: Performed by: NURSE PRACTITIONER

## 2018-01-11 PROCEDURE — 84484 ASSAY OF TROPONIN QUANT: CPT

## 2018-01-11 PROCEDURE — 93306 TTE W/DOPPLER COMPLETE: CPT

## 2018-01-11 PROCEDURE — 93010 ELECTROCARDIOGRAM REPORT: CPT | Mod: 76,,, | Performed by: INTERNAL MEDICINE

## 2018-01-11 PROCEDURE — G0378 HOSPITAL OBSERVATION PER HR: HCPCS

## 2018-01-11 PROCEDURE — 93613 INTRACARDIAC EPHYS 3D MAPG: CPT | Mod: ,,, | Performed by: INTERNAL MEDICINE

## 2018-01-11 PROCEDURE — 37000008 HC ANESTHESIA 1ST 15 MINUTES: Performed by: INTERNAL MEDICINE

## 2018-01-11 PROCEDURE — 37000009 HC ANESTHESIA EA ADD 15 MINS: Performed by: INTERNAL MEDICINE

## 2018-01-11 PROCEDURE — 93621 COMP EP EVL L PAC&REC C SINS: CPT | Mod: 26,,, | Performed by: INTERNAL MEDICINE

## 2018-01-11 PROCEDURE — 80048 BASIC METABOLIC PNL TOTAL CA: CPT

## 2018-01-11 PROCEDURE — D9220A PRA ANESTHESIA: Mod: CRNA,,, | Performed by: NURSE ANESTHETIST, CERTIFIED REGISTERED

## 2018-01-11 PROCEDURE — 99226 PR SUBSEQUENT OBSERVATION CARE,LEVEL III: CPT | Mod: ,,, | Performed by: NURSE PRACTITIONER

## 2018-01-11 PROCEDURE — 27100006 EP LAB PROCEDURE

## 2018-01-11 PROCEDURE — D9220A PRA ANESTHESIA: Mod: ANES,,, | Performed by: ANESTHESIOLOGY

## 2018-01-11 PROCEDURE — 93653 COMPRE EP EVAL TX SVT: CPT | Mod: ,,, | Performed by: INTERNAL MEDICINE

## 2018-01-11 PROCEDURE — 25000003 PHARM REV CODE 250

## 2018-01-11 PROCEDURE — 63600175 PHARM REV CODE 636 W HCPCS: Performed by: NURSE ANESTHETIST, CERTIFIED REGISTERED

## 2018-01-11 PROCEDURE — 84484 ASSAY OF TROPONIN QUANT: CPT | Mod: 91

## 2018-01-11 PROCEDURE — 25000003 PHARM REV CODE 250: Performed by: NURSE ANESTHETIST, CERTIFIED REGISTERED

## 2018-01-11 PROCEDURE — 83735 ASSAY OF MAGNESIUM: CPT

## 2018-01-11 PROCEDURE — 84100 ASSAY OF PHOSPHORUS: CPT

## 2018-01-11 PROCEDURE — 93306 TTE W/DOPPLER COMPLETE: CPT | Mod: 26,,, | Performed by: INTERNAL MEDICINE

## 2018-01-11 PROCEDURE — 99213 OFFICE O/P EST LOW 20 MIN: CPT | Mod: ,,, | Performed by: INTERNAL MEDICINE

## 2018-01-11 PROCEDURE — 93623 PRGRMD STIMJ&PACG IV RX NFS: CPT | Mod: 26,,, | Performed by: INTERNAL MEDICINE

## 2018-01-11 RX ORDER — HYDROMORPHONE HYDROCHLORIDE 2 MG/ML
0.2 INJECTION, SOLUTION INTRAMUSCULAR; INTRAVENOUS; SUBCUTANEOUS EVERY 5 MIN PRN
Status: DISCONTINUED | OUTPATIENT
Start: 2018-01-11 | End: 2018-01-12 | Stop reason: HOSPADM

## 2018-01-11 RX ORDER — DIPHENHYDRAMINE HYDROCHLORIDE 50 MG/ML
25 INJECTION INTRAMUSCULAR; INTRAVENOUS EVERY 6 HOURS PRN
Status: DISCONTINUED | OUTPATIENT
Start: 2018-01-11 | End: 2018-01-12 | Stop reason: HOSPADM

## 2018-01-11 RX ORDER — LIDOCAINE HCL/PF 100 MG/5ML
SYRINGE (ML) INTRAVENOUS
Status: DISCONTINUED | OUTPATIENT
Start: 2018-01-11 | End: 2018-01-12

## 2018-01-11 RX ORDER — PROPOFOL 10 MG/ML
VIAL (ML) INTRAVENOUS CONTINUOUS PRN
Status: DISCONTINUED | OUTPATIENT
Start: 2018-01-11 | End: 2018-01-12

## 2018-01-11 RX ORDER — FENTANYL CITRATE 50 UG/ML
25 INJECTION, SOLUTION INTRAMUSCULAR; INTRAVENOUS EVERY 5 MIN PRN
Status: DISCONTINUED | OUTPATIENT
Start: 2018-01-11 | End: 2018-01-12 | Stop reason: HOSPADM

## 2018-01-11 RX ORDER — PHENYLEPHRINE HYDROCHLORIDE 10 MG/ML
INJECTION INTRAVENOUS
Status: DISCONTINUED | OUTPATIENT
Start: 2018-01-11 | End: 2018-01-12

## 2018-01-11 RX ORDER — GLYCOPYRROLATE 0.2 MG/ML
INJECTION INTRAMUSCULAR; INTRAVENOUS
Status: DISCONTINUED | OUTPATIENT
Start: 2018-01-11 | End: 2018-01-12

## 2018-01-11 RX ORDER — MIDAZOLAM HYDROCHLORIDE 1 MG/ML
INJECTION, SOLUTION INTRAMUSCULAR; INTRAVENOUS
Status: DISCONTINUED | OUTPATIENT
Start: 2018-01-11 | End: 2018-01-12

## 2018-01-11 RX ORDER — MIDAZOLAM HYDROCHLORIDE 1 MG/ML
2 INJECTION INTRAMUSCULAR; INTRAVENOUS ONCE
Status: DISCONTINUED | OUTPATIENT
Start: 2018-01-11 | End: 2018-01-12

## 2018-01-11 RX ORDER — FENTANYL CITRATE 50 UG/ML
INJECTION, SOLUTION INTRAMUSCULAR; INTRAVENOUS
Status: DISCONTINUED | OUTPATIENT
Start: 2018-01-11 | End: 2018-01-12

## 2018-01-11 RX ORDER — DIPHENHYDRAMINE HYDROCHLORIDE 50 MG/ML
25 INJECTION INTRAMUSCULAR; INTRAVENOUS EVERY 6 HOURS PRN
Status: DISCONTINUED | OUTPATIENT
Start: 2018-01-11 | End: 2018-01-11 | Stop reason: SDUPTHER

## 2018-01-11 RX ORDER — ADENOSINE 3 MG/ML
INJECTION INTRAVENOUS
Status: DISCONTINUED | OUTPATIENT
Start: 2018-01-11 | End: 2018-01-12

## 2018-01-11 RX ORDER — DIPHENHYDRAMINE HYDROCHLORIDE 50 MG/ML
INJECTION INTRAMUSCULAR; INTRAVENOUS
Status: DISCONTINUED | OUTPATIENT
Start: 2018-01-11 | End: 2018-01-12

## 2018-01-11 RX ORDER — FENTANYL CITRATE 50 UG/ML
25 INJECTION, SOLUTION INTRAMUSCULAR; INTRAVENOUS EVERY 5 MIN PRN
Status: DISCONTINUED | OUTPATIENT
Start: 2018-01-11 | End: 2018-01-11 | Stop reason: SDUPTHER

## 2018-01-11 RX ADMIN — MIDAZOLAM 1 MG: 1 INJECTION INTRAMUSCULAR; INTRAVENOUS at 02:01

## 2018-01-11 RX ADMIN — DILTIAZEM HYDROCHLORIDE 120 MG: 120 CAPSULE, EXTENDED RELEASE ORAL at 08:01

## 2018-01-11 RX ADMIN — PHENYLEPHRINE HYDROCHLORIDE 100 MCG: 10 INJECTION INTRAVENOUS at 02:01

## 2018-01-11 RX ADMIN — GLYCOPYRROLATE 0.2 MG: 0.2 INJECTION INTRAMUSCULAR; INTRAVENOUS at 02:01

## 2018-01-11 RX ADMIN — SODIUM CHLORIDE, SODIUM GLUCONATE, SODIUM ACETATE, POTASSIUM CHLORIDE, MAGNESIUM CHLORIDE, SODIUM PHOSPHATE, DIBASIC, AND POTASSIUM PHOSPHATE: .53; .5; .37; .037; .03; .012; .00082 INJECTION, SOLUTION INTRAVENOUS at 01:01

## 2018-01-11 RX ADMIN — METOPROLOL SUCCINATE 25 MG: 25 TABLET, EXTENDED RELEASE ORAL at 08:01

## 2018-01-11 RX ADMIN — ROSUVASTATIN CALCIUM 5 MG: 5 TABLET, FILM COATED ORAL at 10:01

## 2018-01-11 RX ADMIN — FENTANYL CITRATE 50 MCG: 50 INJECTION, SOLUTION INTRAMUSCULAR; INTRAVENOUS at 01:01

## 2018-01-11 RX ADMIN — PROPOFOL 75 MCG/KG/MIN: 10 INJECTION, EMULSION INTRAVENOUS at 01:01

## 2018-01-11 RX ADMIN — ADENOSINE 12 MG: 3 SOLUTION INTRAVENOUS at 03:01

## 2018-01-11 RX ADMIN — LIDOCAINE HYDROCHLORIDE 75 MG: 20 INJECTION, SOLUTION INTRAVENOUS at 01:01

## 2018-01-11 RX ADMIN — DIPHENHYDRAMINE HYDROCHLORIDE 25 MG: 50 INJECTION, SOLUTION INTRAMUSCULAR; INTRAVENOUS at 01:01

## 2018-01-11 RX ADMIN — ASPIRIN 81 MG: 81 TABLET, COATED ORAL at 08:01

## 2018-01-11 RX ADMIN — Medication 1 CAPSULE: at 10:01

## 2018-01-11 RX ADMIN — GLYCOPYRROLATE 0.1 MG: 0.2 INJECTION INTRAMUSCULAR; INTRAVENOUS at 01:01

## 2018-01-11 RX ADMIN — ADENOSINE 12 MG: 3 SOLUTION INTRAVENOUS at 02:01

## 2018-01-11 RX ADMIN — VITAMIN D, TAB 1000IU (100/BT) 5000 UNITS: 25 TAB at 08:01

## 2018-01-11 NOTE — ED NOTES
Attempt made to call report, spoke to Daniela, states currently starting an IV and will return call.

## 2018-01-11 NOTE — PROGRESS NOTES
[facility]  Cardiac Electrophysiology  Progress Note    Admission Date: 1/10/2018  Code Status: Full Code   Attending Physician: Ella Nobles MD   Expected Discharge Date:   Principal Problem:SVT (supraventricular tachycardia)    Subjective:     Interval History: No acute events overnight, Denies any chest pain, SOB, palpitations, Dizziness.    Review of Systems   All other systems reviewed and are negative.    Objective:     Vital Signs (Most Recent):  Temp: 98.8 °F (37.1 °C) (01/11/18 0700)  Pulse: 69 (01/11/18 0700)  Resp: 18 (01/11/18 0700)  BP: 126/63 (01/11/18 0700)  SpO2: 95 % (01/11/18 0700) Vital Signs (24h Range):  Temp:  [98 °F (36.7 °C)-99.2 °F (37.3 °C)] 98.8 °F (37.1 °C)  Pulse:  [] 69  Resp:  [17-21] 18  SpO2:  [94 %-100 %] 95 %  BP: ()/() 126/63     Weight: 90.7 kg (200 lb)  Body mass index is 27.12 kg/m².     SpO2: 95 %  O2 Device (Oxygen Therapy): room air    Physical Exam  General: alert, awake and oriented x 3  Eyes:PERRL.   Neck:no JVD   Lungs:  clear to auscultation bilaterally and normal respiratory effort  Cardiovascular: Heart: regular rate and rhythm, S1, S2 normal, no murmur, click, rub or gallop.   Chest Wall: no tenderness.   Extremities: no cyanosis or edema.   Pulses: 2+ and symmetric.  Abdomen/Rectal: Abdomen: soft, non-tender non-distented; bowel sounds normal  Neurologic: Normal strength and tone. No focal numbness or weakness      Significant Labs:   CMP:   Recent Labs  Lab 01/10/18  1120 01/11/18  0350    142   K 3.8 4.3    111*   CO2 29 23   * 99   BUN 20 15   CREATININE 1.5* 1.0   CALCIUM 10.1 8.7   PROT 8.0  --    ALBUMIN 4.1  --    BILITOT 0.6  --    ALKPHOS 111  --    AST 32  --    ALT 64*  --    ANIONGAP 12 8   ESTGFRAFRICA 59.7* >60.0   EGFRNONAA 51.7* >60.0    and CBC:   Recent Labs  Lab 01/10/18  1120   WBC 9.10   HGB 17.6   HCT 52.7          Significant Imaging: Echocardiogram: 2D echo with color flow  doppler: No results found for this or any previous visit.    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

## 2018-01-11 NOTE — ASSESSMENT & PLAN NOTE
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

## 2018-01-11 NOTE — ED NOTES
Care assumed. Hourly rounding complete. Patient resting in stretcher and is in NAD at this time. Pt is awake alert and oriented x4, VSS, respirations even and unlabored. Pt denies pain at this time. Pt updated on POC. Pt requesting PO water, educated on NPO status. Pt remains on continuous cardiac monitor, continuous pulse ox, and auto BP cuff. Bed low and locked with side rails up x2, call bell in pt reach.

## 2018-01-11 NOTE — ASSESSMENT & PLAN NOTE
- awaiting patient to discuss options with insurance company  - Dr. Patel with EP tentatively planning for tomorrow afternoon

## 2018-01-11 NOTE — HOSPITAL COURSE
Admitted to hospital medicine for recurrent SVT and need for AVN ablation.  He is from [place] and will be following up with his EP there.  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  He will need to take ASA 81 mg po qd for 4 weeks and continue his cardizem but d/c his BB.      Dispo: home to f/u with primary EP in [place], he's due to fly back soon.

## 2018-01-11 NOTE — BRIEF OP NOTE
Successful SVT ablation for ORT utilizing a R anterolateral AP. Access  Via LFV x3, RFV x 2. No complications. Bed rest x 3 hours. Aspirin 81 mg qd x 4 weeks.

## 2018-01-11 NOTE — ANESTHESIA PREPROCEDURE EVALUATION
01/11/2018  Reyna Drummond is a 57 y.o., male.  Patient Active Problem List   Diagnosis    SVT (supraventricular tachycardia)    Acute renal failure    Generalized anxiety disorder    Essential hypertension    Mixed hyperlipidemia         Anesthesia Evaluation         Review of Systems      Physical Exam  General:  Well nourished    Airway/Jaw/Neck:  Airway Findings: Mouth Opening: Normal Tongue: Normal  General Airway Assessment: Adult  Mallampati: II  Improves to II with phonation.  TM Distance: Normal, at least 6 cm      Dental:  Dental Findings: In tact   Chest/Lungs:  Chest/Lungs Findings: Clear to auscultation     Heart/Vascular:  Heart Findings: Rate: Normal  Rhythm: Regular Rhythm  Sounds: Normal        Mental Status:  Mental Status Findings:  Cooperative, Alert and Oriented         Anesthesia Plan  Type of Anesthesia, risks & benefits discussed:  Anesthesia Type:  general  Patient's Preference: General  Intra-op Monitoring Plan: standard ASA monitors  Intra-op Monitoring Plan Comments: Standard ASA monitors.   Post Op Pain Control Plan: per primary service following discharge from PACU  Post Op Pain Control Plan Comments: Per primary service.     Induction:   IV  Beta Blocker:  Patient is not currently on a Beta-Blocker (No further documentation required).       Informed Consent: Patient understands risks and agrees with Anesthesia plan.  Questions answered. Anesthesia consent signed with patient.  ASA Score: 3     Day of Surgery Review of History & Physical:    H&P update referred to the surgeon.     Anesthesia Plan Notes: Chart reviewed, patient interviewed and examined.  The plan for general anesthesia was explained.  Questions were answered and the consent was signed.  Albina MARTINEZ         Ready For Surgery From Anesthesia Perspective.

## 2018-01-11 NOTE — ED NOTES
Hourly rounding complete. Patient resting in stretcher and is in NAD at this time. Pt is awake alert and oriented x4, VSS, respirations even and unlabored. Pt denies pain at this time. Pt aware of POC. Bed low and locked with side rails up x2, call bell in pt reach. Pt voices no needs at this time.

## 2018-01-11 NOTE — ED NOTES
Spoke to KENIA Iraheta with [facility] regarding pt NPO status, states pt okay to have medications and sips with medications.

## 2018-01-11 NOTE — ED NOTES
Hourly rounding complete. Patient resting in stretcher and is in NAD at this time. Pt is awake alert and oriented x4, VSS, respirations even and unlabored. Pt aware of POC. Pending med administration pending medications to come from pharmacy, pt informed. Family at bedside. Pt consent form for EP procedure/ anesthesia at bedside, signed by pt MD and witness. Bed low and locked with side rails up x2, call bell in pt reach. Pt voices no needs at this time.

## 2018-01-11 NOTE — SUBJECTIVE & OBJECTIVE
Interval History: No acute events overnight, Denies any chest pain, SOB, palpitations, Dizziness.    Review of Systems   All other systems reviewed and are negative.    Objective:     Vital Signs (Most Recent):  Temp: 98.8 °F (37.1 °C) (01/11/18 0700)  Pulse: 69 (01/11/18 0700)  Resp: 18 (01/11/18 0700)  BP: 126/63 (01/11/18 0700)  SpO2: 95 % (01/11/18 0700) Vital Signs (24h Range):  Temp:  [98 °F (36.7 °C)-99.2 °F (37.3 °C)] 98.8 °F (37.1 °C)  Pulse:  [] 69  Resp:  [17-21] 18  SpO2:  [94 %-100 %] 95 %  BP: ()/() 126/63     Weight: 90.7 kg (200 lb)  Body mass index is 27.12 kg/m².     SpO2: 95 %  O2 Device (Oxygen Therapy): room air    Physical Exam  General: alert, awake and oriented x 3  Eyes:PERRL.   Neck:no JVD   Lungs:  clear to auscultation bilaterally and normal respiratory effort  Cardiovascular: Heart: regular rate and rhythm, S1, S2 normal, no murmur, click, rub or gallop.   Chest Wall: no tenderness.   Extremities: no cyanosis or edema.   Pulses: 2+ and symmetric.  Abdomen/Rectal: Abdomen: soft, non-tender non-distented; bowel sounds normal  Neurologic: Normal strength and tone. No focal numbness or weakness      Significant Labs:   CMP:   Recent Labs  Lab 01/10/18  1120 01/11/18  0350    142   K 3.8 4.3    111*   CO2 29 23   * 99   BUN 20 15   CREATININE 1.5* 1.0   CALCIUM 10.1 8.7   PROT 8.0  --    ALBUMIN 4.1  --    BILITOT 0.6  --    ALKPHOS 111  --    AST 32  --    ALT 64*  --    ANIONGAP 12 8   ESTGFRAFRICA 59.7* >60.0   EGFRNONAA 51.7* >60.0    and CBC:   Recent Labs  Lab 01/10/18  1120   WBC 9.10   HGB 17.6   HCT 52.7          Significant Imaging: Echocardiogram: 2D echo with color flow doppler: No results found for this or any previous visit.

## 2018-01-11 NOTE — ED NOTES
Assumed care of patient. Pt remains on cardiac monitor, continuous pulse ox, and cycling blood pressures. Bed placed in low locked position, side rails up x2, call bell is within reach. Will continue to monitor.

## 2018-01-11 NOTE — ED NOTES
Hourly rounding complete. Patient resting in stretcher and is in NAD at this time. Pt is awake alert and oriented x4, VSS, respirations even and unlabored.Pt aware of POC. Pending med administration pending medications to come from pharmacy, pt informed. Bed low and locked with side rails up x2, call bell in pt reach. Pt voices no needs at this time.

## 2018-01-11 NOTE — SUBJECTIVE & OBJECTIVE
Past Medical History:   Diagnosis Date    Anxiety     Hyperlipidemia     Hypertension     SVT (supraventricular tachycardia)        History reviewed. No pertinent surgical history.    Review of patient's allergies indicates:   Allergen Reactions    Soma [carisoprodol]        No current facility-administered medications on file prior to encounter.      Current Outpatient Prescriptions on File Prior to Encounter   Medication Sig    aspirin (ECOTRIN) 81 MG EC tablet Take 81 mg by mouth once daily.    diltiaZEM (DILACOR XR) 120 MG CDCR Take 120 mg by mouth once daily.    metoprolol succinate (TOPROL XL) 25 MG 24 hr tablet Take 0.5 tablets (12.5 mg total) by mouth once daily. (Patient taking differently: Take 25 mg by mouth 2 (two) times daily. )    rosuvastatin (CRESTOR) 5 MG tablet Take 5 mg by mouth once daily. Taking every other day     Family History     None        Social History Main Topics    Smoking status: Never Smoker    Smokeless tobacco: Not on file    Alcohol use No    Drug use: No    Sexual activity: Not on file     Review of Systems   Constitutional: Negative for activity change, appetite change, chills, fatigue and fever.   HENT: Negative for congestion, rhinorrhea, sinus pressure, sore throat and trouble swallowing.    Eyes: Negative for pain, redness and visual disturbance.   Respiratory: Positive for chest tightness and shortness of breath. Negative for cough, wheezing and stridor.    Cardiovascular: Positive for palpitations. Negative for chest pain and leg swelling.   Gastrointestinal: Negative for abdominal distention, abdominal pain, blood in stool, constipation, diarrhea, nausea and vomiting.   Endocrine: Negative for cold intolerance and heat intolerance.   Genitourinary: Negative for dysuria, enuresis, frequency, hematuria and urgency.   Musculoskeletal: Negative for arthralgias, back pain, myalgias and neck pain.   Skin: Negative for color change, pallor and rash.    Allergic/Immunologic: Negative for immunocompromised state.   Neurological: Negative for dizziness, tremors, syncope, weakness, light-headedness, numbness and headaches.   Hematological: Does not bruise/bleed easily.   Psychiatric/Behavioral: Negative for agitation and confusion. The patient is not nervous/anxious.      Objective:     Vital Signs (Most Recent):  Temp: 99.2 °F (37.3 °C) (01/10/18 2000)  Pulse: 84 (01/10/18 2202)  Resp: 20 (01/10/18 2000)  BP: 127/75 (01/10/18 2202)  SpO2: 96 % (01/10/18 2202) Vital Signs (24h Range):  Temp:  [98 °F (36.7 °C)-99.2 °F (37.3 °C)] 99.2 °F (37.3 °C)  Pulse:  [] 84  Resp:  [17-21] 20  SpO2:  [95 %-100 %] 96 %  BP: ()/() 127/75     Weight: 90.7 kg (200 lb)  Body mass index is 27.12 kg/m².    Physical Exam   Constitutional: He is oriented to person, place, and time. He appears well-developed and well-nourished. No distress.   HENT:   Head: Normocephalic and atraumatic.   Right Ear: External ear normal.   Left Ear: External ear normal.   Nose: Nose normal.   Mouth/Throat: Oropharynx is clear and moist.   Eyes: Conjunctivae and EOM are normal. No scleral icterus.   Neck: Normal range of motion. Neck supple. No JVD present. No tracheal deviation present. No thyromegaly present.   Cardiovascular: Normal rate, regular rhythm, normal heart sounds and intact distal pulses.  Exam reveals no gallop and no friction rub.    No murmur heard.  Pulmonary/Chest: Effort normal and breath sounds normal. No respiratory distress. He has no wheezes. He has no rales.   Abdominal: Soft. Bowel sounds are normal. He exhibits no distension and no mass. There is no tenderness. There is no rebound and no guarding.   Musculoskeletal: Normal range of motion. He exhibits no edema or tenderness.   Neurological: He is alert and oriented to person, place, and time. No cranial nerve deficit or sensory deficit. He exhibits normal muscle tone. Coordination normal.   Skin: Skin is warm and  dry. No rash noted. No erythema.   Psychiatric: He has a normal mood and affect. His behavior is normal. Judgment and thought content normal.   Nursing note and vitals reviewed.        CRANIAL NERVES     CN III, IV, VI   Extraocular motions are normal.        Significant Labs:   CBC:   Recent Labs  Lab 01/10/18  1120   WBC 9.10   HGB 17.6   HCT 52.7        CMP:   Recent Labs  Lab 01/10/18  1120      K 3.8      CO2 29   *   BUN 20   CREATININE 1.5*   CALCIUM 10.1   PROT 8.0   ALBUMIN 4.1   BILITOT 0.6   ALKPHOS 111   AST 32   ALT 64*   ANIONGAP 12   EGFRNONAA 51.7*     Cardiac Markers: No results for input(s): CKMB, MYOGLOBIN, BNP, TROPISTAT in the last 48 hours.  Troponin:   Recent Labs  Lab 01/10/18  1120 01/10/18  1804   TROPONINI 0.085* 0.088*     TSH:   Recent Labs  Lab 01/10/18  1120   TSH 2.547     All pertinent labs within the past 24 hours have been reviewed.    Significant Imaging: I have reviewed and interpreted all pertinent imaging results/findings within the past 24 hours.

## 2018-01-11 NOTE — HPI
56 y/o male with PMH of HTN, Anxiety, HLD and SVT for 3 years occuring about once a month to once every few months, he lives primarily in [place] and was offered an ablation in the past.  While visiting family here in [place], he's had three separate episodes of SVT which have brought him to the ED, each time he's successfully converted with Adenosine IV either 6/12 mg dosage. He was previously sent out on 12.5mg Toprol BID in addition to Home dilt 120mg XR which he has been taking for some time. He was having URI symptoms and had been taking cold medications which were thought to have precipitated event so he stopped taking them but had another episode today. 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 SBP was in 70's with HR in 200's and he spilled some troponin. No CP. Very active and doesn't get CP or SOB when playing soccer or softball.

## 2018-01-11 NOTE — H&P
[facility] Hospital Medicine  History & Physical    Patient Name: Reyna Drummond  MRN: [id number]  Admission Date: 1/10/2018  Attending Physician: Ella Nobles MD   Primary Care Provider: No primary care provider on file.    [facility] Medicine Team: [facility] J Berta Iraheta NP     Patient information was obtained from patient and ER records.     Subjective:     Principal Problem:SVT (supraventricular tachycardia)    Chief Complaint:   Chief Complaint   Patient presents with    Tachycardia     hx svt        HPI: 58 y/o male with PMH of HTN, Anxiety, HLD and SVT for 3 years occuring about once a month to once every few months, he lives primarily in [place] and was offered an ablation in the past.  While visiting family here in [place], he's had three separate episodes of SVT which have brought him to the ED, each time he's successfully converted with Adenosine IV either 6/12 mg dosage. He was previously sent out on 12.5mg Toprol BID in addition to Home dilt 120mg XR which he has been taking for some time. He was having URI symptoms and had been taking cold medications which were thought to have precipitated event so he stopped taking them but had another episode today. 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 SBP was in 70's with HR in 200's and he spilled some troponin. No CP. Very active and doesn't get CP or SOB when playing soccer or softball.      Past Medical History:   Diagnosis Date    Anxiety     Hyperlipidemia     Hypertension     SVT (supraventricular tachycardia)        History reviewed. No pertinent surgical history.    Review of patient's allergies indicates:   Allergen Reactions    Soma [carisoprodol]        No current facility-administered medications on file prior to encounter.      Current Outpatient Prescriptions on File Prior to Encounter   Medication Sig    aspirin (ECOTRIN) 81 MG EC tablet Take 81 mg by mouth  once daily.    diltiaZEM (DILACOR XR) 120 MG CDCR Take 120 mg by mouth once daily.    metoprolol succinate (TOPROL XL) 25 MG 24 hr tablet Take 0.5 tablets (12.5 mg total) by mouth once daily. (Patient taking differently: Take 25 mg by mouth 2 (two) times daily. )    rosuvastatin (CRESTOR) 5 MG tablet Take 5 mg by mouth once daily. Taking every other day     Family History     None        Social History Main Topics    Smoking status: Never Smoker    Smokeless tobacco: Not on file    Alcohol use No    Drug use: No    Sexual activity: Not on file     Review of Systems   Constitutional: Negative for activity change, appetite change, chills, fatigue and fever.   HENT: Negative for congestion, rhinorrhea, sinus pressure, sore throat and trouble swallowing.    Eyes: Negative for pain, redness and visual disturbance.   Respiratory: Positive for chest tightness and shortness of breath. Negative for cough, wheezing and stridor.    Cardiovascular: Positive for palpitations. Negative for chest pain and leg swelling.   Gastrointestinal: Negative for abdominal distention, abdominal pain, blood in stool, constipation, diarrhea, nausea and vomiting.   Endocrine: Negative for cold intolerance and heat intolerance.   Genitourinary: Negative for dysuria, enuresis, frequency, hematuria and urgency.   Musculoskeletal: Negative for arthralgias, back pain, myalgias and neck pain.   Skin: Negative for color change, pallor and rash.   Allergic/Immunologic: Negative for immunocompromised state.   Neurological: Negative for dizziness, tremors, syncope, weakness, light-headedness, numbness and headaches.   Hematological: Does not bruise/bleed easily.   Psychiatric/Behavioral: Negative for agitation and confusion. The patient is not nervous/anxious.      Objective:     Vital Signs (Most Recent):  Temp: 99.2 °F (37.3 °C) (01/10/18 2000)  Pulse: 84 (01/10/18 2202)  Resp: 20 (01/10/18 2000)  BP: 127/75 (01/10/18 2202)  SpO2: 96 % (01/10/18  2202) Vital Signs (24h Range):  Temp:  [98 °F (36.7 °C)-99.2 °F (37.3 °C)] 99.2 °F (37.3 °C)  Pulse:  [] 84  Resp:  [17-21] 20  SpO2:  [95 %-100 %] 96 %  BP: ()/() 127/75     Weight: 90.7 kg (200 lb)  Body mass index is 27.12 kg/m².    Physical Exam   Constitutional: He is oriented to person, place, and time. He appears well-developed and well-nourished. No distress.   HENT:   Head: Normocephalic and atraumatic.   Right Ear: External ear normal.   Left Ear: External ear normal.   Nose: Nose normal.   Mouth/Throat: Oropharynx is clear and moist.   Eyes: Conjunctivae and EOM are normal. No scleral icterus.   Neck: Normal range of motion. Neck supple. No JVD present. No tracheal deviation present. No thyromegaly present.   Cardiovascular: Normal rate, regular rhythm, normal heart sounds and intact distal pulses.  Exam reveals no gallop and no friction rub.    No murmur heard.  Pulmonary/Chest: Effort normal and breath sounds normal. No respiratory distress. He has no wheezes. He has no rales.   Abdominal: Soft. Bowel sounds are normal. He exhibits no distension and no mass. There is no tenderness. There is no rebound and no guarding.   Musculoskeletal: Normal range of motion. He exhibits no edema or tenderness.   Neurological: He is alert and oriented to person, place, and time. No cranial nerve deficit or sensory deficit. He exhibits normal muscle tone. Coordination normal.   Skin: Skin is warm and dry. No rash noted. No erythema.   Psychiatric: He has a normal mood and affect. His behavior is normal. Judgment and thought content normal.   Nursing note and vitals reviewed.        CRANIAL NERVES     CN III, IV, VI   Extraocular motions are normal.        Significant Labs:   CBC:   Recent Labs  Lab 01/10/18  1120   WBC 9.10   HGB 17.6   HCT 52.7        CMP:   Recent Labs  Lab 01/10/18  1120      K 3.8      CO2 29   *   BUN 20   CREATININE 1.5*   CALCIUM 10.1   PROT 8.0    ALBUMIN 4.1   BILITOT 0.6   ALKPHOS 111   AST 32   ALT 64*   ANIONGAP 12   EGFRNONAA 51.7*     Cardiac Markers: No results for input(s): CKMB, MYOGLOBIN, BNP, TROPISTAT in the last 48 hours.  Troponin:   Recent Labs  Lab 01/10/18  1120 01/10/18  1804   TROPONINI 0.085* 0.088*     TSH:   Recent Labs  Lab 01/10/18  1120   TSH 2.547     All pertinent labs within the past 24 hours have been reviewed.    Significant Imaging: I have reviewed and interpreted all pertinent imaging results/findings within the past 24 hours.    Assessment/Plan:     * SVT (supraventricular tachycardia)    - awaiting patient to discuss options with insurance company  - Dr. Patel with EP tentatively planning for tomorrow afternoon          Acute renal failure    CR 1.5 on admit, unknown baseline          Generalized anxiety disorder    - takes diazepam prn            Mixed hyperlipidemia    Takes crestor every other day for leg cramps  - continue check lipid panel          Essential hypertension    Cardizem and metoprolol   - sbp goal < 130/80            VTE Risk Mitigation         Ordered     Low Risk of VTE  Once      01/10/18 1620             Berta Iraheta NP  Department of Hospital Medicine   [facility]  Spectra:  [phone]  Pager: [phone]

## 2018-01-11 NOTE — ED NOTES
Hourly rounding complete. Patient resting in stretcher and is in NAD at this time. Pt is awake alert and oriented x4, VSS, respirations even and unlabored. Pt aware of POC. Family at bedside. Bed low and locked with side rails up x2, call bell in pt reach. Pt voices no needs at this time.

## 2018-01-12 VITALS
WEIGHT: 203.69 LBS | BODY MASS INDEX: 28.52 KG/M2 | DIASTOLIC BLOOD PRESSURE: 79 MMHG | SYSTOLIC BLOOD PRESSURE: 124 MMHG | RESPIRATION RATE: 16 BRPM | OXYGEN SATURATION: 93 % | TEMPERATURE: 98 F | HEIGHT: 71 IN | HEART RATE: 76 BPM

## 2018-01-12 PROBLEM — N17.9 ACUTE RENAL FAILURE: Status: RESOLVED | Noted: 2018-01-10 | Resolved: 2018-01-12

## 2018-01-12 LAB
ANION GAP SERPL CALC-SCNC: 8 MMOL/L
BUN SERPL-MCNC: 16 MG/DL
CALCIUM SERPL-MCNC: 8.9 MG/DL
CHLORIDE SERPL-SCNC: 109 MMOL/L
CO2 SERPL-SCNC: 25 MMOL/L
CREAT SERPL-MCNC: 0.9 MG/DL
EST. GFR  (AFRICAN AMERICAN): >60 ML/MIN/1.73 M^2
EST. GFR  (NON AFRICAN AMERICAN): >60 ML/MIN/1.73 M^2
GLUCOSE SERPL-MCNC: 86 MG/DL
MAGNESIUM SERPL-MCNC: 1.8 MG/DL
PHOSPHATE SERPL-MCNC: 2.7 MG/DL
POTASSIUM SERPL-SCNC: 4 MMOL/L
SODIUM SERPL-SCNC: 142 MMOL/L

## 2018-01-12 PROCEDURE — 99217 PR OBSERVATION CARE DISCHARGE: CPT | Mod: ,,, | Performed by: NURSE PRACTITIONER

## 2018-01-12 PROCEDURE — 83735 ASSAY OF MAGNESIUM: CPT

## 2018-01-12 PROCEDURE — 84100 ASSAY OF PHOSPHORUS: CPT

## 2018-01-12 PROCEDURE — 63600175 PHARM REV CODE 636 W HCPCS: Performed by: NURSE PRACTITIONER

## 2018-01-12 PROCEDURE — G0378 HOSPITAL OBSERVATION PER HR: HCPCS

## 2018-01-12 PROCEDURE — 25000003 PHARM REV CODE 250: Performed by: NURSE PRACTITIONER

## 2018-01-12 PROCEDURE — 36415 COLL VENOUS BLD VENIPUNCTURE: CPT

## 2018-01-12 PROCEDURE — 80048 BASIC METABOLIC PNL TOTAL CA: CPT

## 2018-01-12 PROCEDURE — 99212 OFFICE O/P EST SF 10 MIN: CPT | Mod: ,,, | Performed by: INTERNAL MEDICINE

## 2018-01-12 RX ORDER — ASPIRIN 81 MG/1
81 TABLET ORAL DAILY
Refills: 0 | COMMUNITY
Start: 2018-01-12

## 2018-01-12 RX ORDER — MAGNESIUM SULFATE HEPTAHYDRATE 40 MG/ML
2 INJECTION, SOLUTION INTRAVENOUS ONCE
Status: COMPLETED | OUTPATIENT
Start: 2018-01-12 | End: 2018-01-12

## 2018-01-12 RX ADMIN — DILTIAZEM HYDROCHLORIDE 120 MG: 120 CAPSULE, EXTENDED RELEASE ORAL at 08:01

## 2018-01-12 RX ADMIN — ROSUVASTATIN CALCIUM 5 MG: 5 TABLET, FILM COATED ORAL at 08:01

## 2018-01-12 RX ADMIN — ASPIRIN 81 MG: 81 TABLET, COATED ORAL at 08:01

## 2018-01-12 RX ADMIN — Medication 1 CAPSULE: at 08:01

## 2018-01-12 RX ADMIN — MAGNESIUM SULFATE IN WATER 2 G: 40 INJECTION, SOLUTION INTRAVENOUS at 08:01

## 2018-01-12 RX ADMIN — CETIRIZINE HYDROCHLORIDE 10 MG: 5 TABLET, FILM COATED ORAL at 08:01

## 2018-01-12 NOTE — PLAN OF CARE
01/12/18 0943   Discharge Assessment   Assessment Type Discharge Planning Assessment   Confirmed/corrected address and phone number on facesheet? Yes   Assessment information obtained from? Patient;Medical Record   Expected Length of Stay (days) 1   Communicated expected length of stay with patient/caregiver yes   Prior to hospitilization cognitive status: Alert/Oriented   Prior to hospitalization functional status: Independent   Current cognitive status: Alert/Oriented   Current Functional Status: Independent   Lives With spouse   Able to Return to Prior Arrangements yes   Is patient able to care for self after discharge? Yes   Patient's perception of discharge disposition home or selfcare   Readmission Within The Last 30 Days no previous admission in last 30 days   Patient currently being followed by outpatient case management? No   Patient currently receives any other outside agency services? No   Equipment Currently Used at Home none   Do you have any problems affording any of your prescribed medications? No   Is the patient taking medications as prescribed? yes   Does the patient have transportation home? Yes   Transportation Available family or friend will provide   Does the patient receive services at the [facility]? No   Discharge Plan A Home with family   Patient/Family In Agreement With Plan yes   Placed in Obs for SVT. Pt is now post ablation. Lives with wife in [facility]. Independent in his ADLs. Plan is to DC home. No DC needs identified.    PCP: Dr Rik Franz  [address] [place] [address] Rd 2nd floor  [facility]  [phone]    Cardiologist:  Dr Anibal Walton  [address] [facility]  [address]  P [phone]  F [phone]

## 2018-01-12 NOTE — NURSING
Received and acknowledge discharge instructions ordered at 0735 hours.  Ordered to hold discharged for Magnesium Sulfate infusion.  Provided a copy of AVS to patient.  Discharge instructions explained to patient.   Discontinued PIV in left antecubital.  Tip intact.  Removed telemetry.  Informed patient of future follow-up appointments.  Administered all ordered medications up to and including 1000 hours.  Explained medication regime to include new, continued, and discontinued medications.  Patient informed when next dose is due for all medications. Completed MIS.  Discussed when to call the doctor. Discussed heart failure tips, diagnosing heart failure, treatment plan, diet, procedures, tracking weight, signs of a flare-up to include:  swelling, shortness of breath dizziness, chest pain and cough.  No bleeding, swelling or discoloration noted at right/left groin sites.  Patient acknowledged understanding of all instructions. Will continue to monitor patient until off unit. Transport requested.

## 2018-01-12 NOTE — PROGRESS NOTES
[facility] Hospital Medicine  Progress Note    Patient Name: Reyna Drummond  MRN: [id number]  Patient Class: OP- Observation   Admission Date: 1/10/2018  Length of Stay: 0 days  Attending Physician: Ella Nobles MD  Primary Care Provider: No primary care provider on file.    Hospital Medicine Team: [facility] MED J Berta Iraheta NP    Subjective:     Principal Problem:SVT (supraventricular tachycardia)    HPI:  56 y/o male with PMH of HTN, Anxiety, HLD and SVT for 3 years occuring about once a month to once every few months, he lives primarily in [place] and was offered an ablation in the past.  While visiting family here in [place], he's had three separate episodes of SVT which have brought him to the ED, each time he's successfully converted with Adenosine IV either 6/12 mg dosage. He was previously sent out on 12.5mg Toprol BID in addition to Home dilt 120mg XR which he has been taking for some time. He was having URI symptoms and had been taking cold medications which were thought to have precipitated event so he stopped taking them but had another episode today. 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 SBP was in 70's with HR in 200's and he spilled some troponin. No CP. Very active and doesn't get CP or SOB when playing soccer or softball.      Hospital Course:  Admitted to hospital medicine for recurrent SVT and need for AVN ablation.  He is from [place] and will be following up with his EP there.  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  He will need to take ASA 81 mg po qd for 4 weeks and continue his cardizem but d/c his BB.      Dispo: home in am    Interval History: Seen in PACU, recovering well, can sit up in 3 hours.    Review of Systems   Constitutional: Negative for activity change, appetite change, chills, fatigue and fever.   HENT: Negative for congestion, rhinorrhea, sinus pressure, sore throat and  trouble swallowing.    Eyes: Negative for pain, redness and visual disturbance.   Respiratory: Negative for cough, chest tightness, shortness of breath, wheezing and stridor.    Cardiovascular: Negative for chest pain, palpitations and leg swelling.   Gastrointestinal: Negative for abdominal distention, abdominal pain, blood in stool, constipation, diarrhea, nausea and vomiting.   Endocrine: Negative for cold intolerance and heat intolerance.   Genitourinary: Negative for dysuria, enuresis, frequency, hematuria and urgency.   Musculoskeletal: Negative for arthralgias, back pain, myalgias and neck pain.   Skin: Negative for color change, pallor and rash.   Allergic/Immunologic: Negative for immunocompromised state.   Neurological: Negative for dizziness, tremors, syncope, weakness, light-headedness, numbness and headaches.   Hematological: Does not bruise/bleed easily.   Psychiatric/Behavioral: Negative for agitation and confusion. The patient is not nervous/anxious.      Objective:     Vital Signs (Most Recent):  Temp: 98.8 °F (37.1 °C) (01/11/18 2000)  Pulse: 72 (01/11/18 2000)  Resp: 16 (01/11/18 2000)  BP: 130/79 (01/11/18 2000)  SpO2: 98 % (01/11/18 2000) Vital Signs (24h Range):  Temp:  [98.2 °F (36.8 °C)-98.8 °F (37.1 °C)] 98.8 °F (37.1 °C)  Pulse:  [66-83] 72  Resp:  [12-21] 16  SpO2:  [94 %-99 %] 98 %  BP: (111-151)/(63-91) 130/79     Weight: 95.2 kg (209 lb 14.4 oz)  Body mass index is 29.28 kg/m².    Intake/Output Summary (Last 24 hours) at 01/11/18 222  Last data filed at 01/11/18 2000   Gross per 24 hour   Intake              590 ml   Output                0 ml   Net              590 ml      Physical Exam   Constitutional: He is oriented to person, place, and time. He appears well-developed and well-nourished. No distress.   HENT:   Head: Normocephalic and atraumatic.   Right Ear: External ear normal.   Left Ear: External ear normal.   Nose: Nose normal.   Mouth/Throat: Oropharynx is clear and moist.    Eyes: Conjunctivae and EOM are normal. No scleral icterus.   Neck: Normal range of motion. Neck supple. No JVD present. No tracheal deviation present. No thyromegaly present.   Cardiovascular: Normal rate, regular rhythm, normal heart sounds and intact distal pulses.  Exam reveals no gallop and no friction rub.    No murmur heard.  Pulmonary/Chest: Effort normal and breath sounds normal. No respiratory distress. He has no wheezes. He has no rales.   Abdominal: Soft. Bowel sounds are normal. He exhibits no distension and no mass. There is no tenderness. There is no rebound and no guarding.   Musculoskeletal: Normal range of motion. He exhibits no edema or tenderness.   Neurological: He is alert and oriented to person, place, and time. No cranial nerve deficit or sensory deficit. He exhibits normal muscle tone. Coordination normal.   Skin: Skin is warm and dry. Capillary refill takes less than 2 seconds. No rash noted. No erythema.   Psychiatric: He has a normal mood and affect. His behavior is normal. Judgment and thought content normal.   Nursing note and vitals reviewed.      Significant Labs:   BMP:   Recent Labs  Lab 01/11/18  0350   GLU 99      K 4.3   *   CO2 23   BUN 15   CREATININE 1.0   CALCIUM 8.7   MG 2.0     CBC:   Recent Labs  Lab 01/10/18  1120   WBC 9.10   HGB 17.6   HCT 52.7        CMP:   Recent Labs  Lab 01/10/18  1120 01/11/18  0350    142   K 3.8 4.3    111*   CO2 29 23   * 99   BUN 20 15   CREATININE 1.5* 1.0   CALCIUM 10.1 8.7   PROT 8.0  --    ALBUMIN 4.1  --    BILITOT 0.6  --    ALKPHOS 111  --    AST 32  --    ALT 64*  --    ANIONGAP 12 8   EGFRNONAA 51.7* >60.0     Cardiac Markers: No results for input(s): CKMB, MYOGLOBIN, BNP, TROPISTAT in the last 48 hours.  Magnesium:   Recent Labs  Lab 01/10/18  1120 01/11/18  0350   MG 2.1 2.0       Significant Imaging: I have reviewed all pertinent imaging results/findings within the past 24 hours.     Echo:  1/11/18   1 - Normal left ventricular systolic function (EF 60-65%).     2 - Normal left ventricular diastolic function.     3 - Normal right ventricular systolic function    S/p AVN ablation 1/11/18 succesful .     Assessment/Plan:      * SVT (supraventricular tachycardia)    - s/p Successful Ablation with Dr. Patel   - f/u with home EP in [place]            Acute renal failure    CR 1.5 on admit, unknown baseline, down to 1.0 today          Generalized anxiety disorder    - takes diazepam prn            Mixed hyperlipidemia    Takes crestor every other day for leg cramps  - lipid panel wnl, HDL low at 24          Essential hypertension    Cardizem continue d/c metoprolol   - sbp goal < 130/80            VTE Risk Mitigation         Ordered     Low Risk of VTE  Once      01/10/18 1620              Berta Iraheta NP  Department of Hospital Medicine   [facility]  Spectra:  [phone]  Pager: [phone]

## 2018-01-12 NOTE — SUBJECTIVE & OBJECTIVE
Interval History: No overnight events    Review of Systems   All other systems reviewed and are negative.    Objective:     Vital Signs (Most Recent):  Temp: 98.4 °F (36.9 °C) (01/12/18 0736)  Pulse: 76 (01/12/18 0736)  Resp: 16 (01/12/18 0736)  BP: 124/79 (01/12/18 0736)  SpO2: (!) 93 % (01/12/18 0736) Vital Signs (24h Range):  Temp:  [98.2 °F (36.8 °C)-99 °F (37.2 °C)] 98.4 °F (36.9 °C)  Pulse:  [67-83] 76  Resp:  [12-18] 16  SpO2:  [92 %-99 %] 93 %  BP: (116-151)/(67-91) 124/79     Weight: 95.2 kg (209 lb 14.4 oz)  Body mass index is 29.28 kg/m².     SpO2: (!) 93 %  O2 Device (Oxygen Therapy): room air    Physical Exam  General: alert, awake and oriented x 3  Eyes:PERRL.   Neck:no JVD   Lungs:  clear to auscultation bilaterally and normal respiratory effort  Cardiovascular: Heart: regular rate and rhythm, S1, S2 normal, no murmur, click, rub or gallop.   Chest Wall: no tenderness.   Extremities: no cyanosis or edema. B/l groin stable  Pulses: 2+ and symmetric.  Abdomen/Rectal: Abdomen: soft, non-tender non-distented; bowel sounds normal  Skin: No rashes or lesions  Neurologic: Normal strength and tone. No focal numbness or weakness  Psych/Behavioral:  Normal.    Significant Labs:   CMP:   Recent Labs  Lab 01/10/18  1120 01/11/18  0350 01/12/18  0402    142 142   K 3.8 4.3 4.0    111* 109   CO2 29 23 25   * 99 86   BUN 20 15 16   CREATININE 1.5* 1.0 0.9   CALCIUM 10.1 8.7 8.9   PROT 8.0  --   --    ALBUMIN 4.1  --   --    BILITOT 0.6  --   --    ALKPHOS 111  --   --    AST 32  --   --    ALT 64*  --   --    ANIONGAP 12 8 8   ESTGFRAFRICA 59.7* >60.0 >60.0   EGFRNONAA 51.7* >60.0 >60.0    and CBC:   Recent Labs  Lab 01/10/18  1120   WBC 9.10   HGB 17.6   HCT 52.7          Significant Imaging: Echocardiogram:   2D echo with color flow doppler:   Results for orders placed or performed during the hospital encounter of 01/10/18   2D echo with color flow doppler   Result Value Ref Range    EF  65 55 - 65    Diastolic Dysfunction No

## 2018-01-12 NOTE — PROGRESS NOTES
Report received from Kaylene RN, Pt transferred from 351 to 356. Bilateral groin sites assessed, CDI. Bilateral +2 pulses  Patient voices no complaints at this time. Bed in lowest position, locked, SR up x2, call bell in reach. Will continue to monitor patient.

## 2018-01-12 NOTE — SUBJECTIVE & OBJECTIVE
Interval History: Seen in PACU, recovering well, can sit up in 3 hours.    Review of Systems   Constitutional: Negative for activity change, appetite change, chills, fatigue and fever.   HENT: Negative for congestion, rhinorrhea, sinus pressure, sore throat and trouble swallowing.    Eyes: Negative for pain, redness and visual disturbance.   Respiratory: Negative for cough, chest tightness, shortness of breath, wheezing and stridor.    Cardiovascular: Negative for chest pain, palpitations and leg swelling.   Gastrointestinal: Negative for abdominal distention, abdominal pain, blood in stool, constipation, diarrhea, nausea and vomiting.   Endocrine: Negative for cold intolerance and heat intolerance.   Genitourinary: Negative for dysuria, enuresis, frequency, hematuria and urgency.   Musculoskeletal: Negative for arthralgias, back pain, myalgias and neck pain.   Skin: Negative for color change, pallor and rash.   Allergic/Immunologic: Negative for immunocompromised state.   Neurological: Negative for dizziness, tremors, syncope, weakness, light-headedness, numbness and headaches.   Hematological: Does not bruise/bleed easily.   Psychiatric/Behavioral: Negative for agitation and confusion. The patient is not nervous/anxious.      Objective:     Vital Signs (Most Recent):  Temp: 98.8 °F (37.1 °C) (01/11/18 2000)  Pulse: 72 (01/11/18 2000)  Resp: 16 (01/11/18 2000)  BP: 130/79 (01/11/18 2000)  SpO2: 98 % (01/11/18 2000) Vital Signs (24h Range):  Temp:  [98.2 °F (36.8 °C)-98.8 °F (37.1 °C)] 98.8 °F (37.1 °C)  Pulse:  [66-83] 72  Resp:  [12-21] 16  SpO2:  [94 %-99 %] 98 %  BP: (111-151)/(63-91) 130/79     Weight: 95.2 kg (209 lb 14.4 oz)  Body mass index is 29.28 kg/m².    Intake/Output Summary (Last 24 hours) at 01/11/18 2222  Last data filed at 01/11/18 2000   Gross per 24 hour   Intake              590 ml   Output                0 ml   Net              590 ml      Physical Exam   Constitutional: He is oriented to  person, place, and time. He appears well-developed and well-nourished. No distress.   HENT:   Head: Normocephalic and atraumatic.   Right Ear: External ear normal.   Left Ear: External ear normal.   Nose: Nose normal.   Mouth/Throat: Oropharynx is clear and moist.   Eyes: Conjunctivae and EOM are normal. No scleral icterus.   Neck: Normal range of motion. Neck supple. No JVD present. No tracheal deviation present. No thyromegaly present.   Cardiovascular: Normal rate, regular rhythm, normal heart sounds and intact distal pulses.  Exam reveals no gallop and no friction rub.    No murmur heard.  Pulmonary/Chest: Effort normal and breath sounds normal. No respiratory distress. He has no wheezes. He has no rales.   Abdominal: Soft. Bowel sounds are normal. He exhibits no distension and no mass. There is no tenderness. There is no rebound and no guarding.   Musculoskeletal: Normal range of motion. He exhibits no edema or tenderness.   Neurological: He is alert and oriented to person, place, and time. No cranial nerve deficit or sensory deficit. He exhibits normal muscle tone. Coordination normal.   Skin: Skin is warm and dry. Capillary refill takes less than 2 seconds. No rash noted. No erythema.   Psychiatric: He has a normal mood and affect. His behavior is normal. Judgment and thought content normal.   Nursing note and vitals reviewed.      Significant Labs:   BMP:   Recent Labs  Lab 01/11/18  0350   GLU 99      K 4.3   *   CO2 23   BUN 15   CREATININE 1.0   CALCIUM 8.7   MG 2.0     CBC:   Recent Labs  Lab 01/10/18  1120   WBC 9.10   HGB 17.6   HCT 52.7        CMP:   Recent Labs  Lab 01/10/18  1120 01/11/18  0350    142   K 3.8 4.3    111*   CO2 29 23   * 99   BUN 20 15   CREATININE 1.5* 1.0   CALCIUM 10.1 8.7   PROT 8.0  --    ALBUMIN 4.1  --    BILITOT 0.6  --    ALKPHOS 111  --    AST 32  --    ALT 64*  --    ANIONGAP 12 8   EGFRNONAA 51.7* >60.0     Cardiac Markers: No results  for input(s): CKMB, MYOGLOBIN, BNP, TROPISTAT in the last 48 hours.  Magnesium:   Recent Labs  Lab 01/10/18  1120 01/11/18  0350   MG 2.1 2.0       Significant Imaging: I have reviewed all pertinent imaging results/findings within the past 24 hours.     Echo: 1/11/18   1 - Normal left ventricular systolic function (EF 60-65%).     2 - Normal left ventricular diastolic function.     3 - Normal right ventricular systolic function    S/p AVN ablation 1/11/18 succesful .

## 2018-01-12 NOTE — NURSING
Pt arrived to the floor via stretcher. Family present at the bedside. Rt and lt groin access sites. No hematoma present. CDI. Vss. Bed low and locked. Call bell within reach. sr up x2. Encouraged pt to call for any assistance.

## 2018-01-12 NOTE — ASSESSMENT & PLAN NOTE
- Short RP tachycardia   - Successful ablation of right anterolateral concealed accessory pathway.  - Can cont dilt 120mg XR and aspirin 81 mg x 4 weeks  - Ok to discharge home, Follow up with primary cardiologist in [place]

## 2018-01-12 NOTE — PROGRESS NOTES
[facility]  Cardiac Electrophysiology  Progress Note    Admission Date: 1/10/2018  Code Status: Full Code   Attending Physician: Ella Nobles MD   Expected Discharge Date: 1/12/2018  Principal Problem:SVT (supraventricular tachycardia)    Subjective:     Interval History: No overnight events    Review of Systems   All other systems reviewed and are negative.    Objective:     Vital Signs (Most Recent):  Temp: 98.4 °F (36.9 °C) (01/12/18 0736)  Pulse: 76 (01/12/18 0736)  Resp: 16 (01/12/18 0736)  BP: 124/79 (01/12/18 0736)  SpO2: (!) 93 % (01/12/18 0736) Vital Signs (24h Range):  Temp:  [98.2 °F (36.8 °C)-99 °F (37.2 °C)] 98.4 °F (36.9 °C)  Pulse:  [67-83] 76  Resp:  [12-18] 16  SpO2:  [92 %-99 %] 93 %  BP: (116-151)/(67-91) 124/79     Weight: 95.2 kg (209 lb 14.4 oz)  Body mass index is 29.28 kg/m².     SpO2: (!) 93 %  O2 Device (Oxygen Therapy): room air    Physical Exam  General: alert, awake and oriented x 3  Eyes:PERRL.   Neck:no JVD   Lungs:  clear to auscultation bilaterally and normal respiratory effort  Cardiovascular: Heart: regular rate and rhythm, S1, S2 normal, no murmur, click, rub or gallop.   Chest Wall: no tenderness.   Extremities: no cyanosis or edema. B/l groin stable  Pulses: 2+ and symmetric.  Abdomen/Rectal: Abdomen: soft, non-tender non-distented; bowel sounds normal  Skin: No rashes or lesions  Neurologic: Normal strength and tone. No focal numbness or weakness  Psych/Behavioral:  Normal.    Significant Labs:   CMP:   Recent Labs  Lab 01/10/18  1120 01/11/18  0350 01/12/18  0402    142 142   K 3.8 4.3 4.0    111* 109   CO2 29 23 25   * 99 86   BUN 20 15 16   CREATININE 1.5* 1.0 0.9   CALCIUM 10.1 8.7 8.9   PROT 8.0  --   --    ALBUMIN 4.1  --   --    BILITOT 0.6  --   --    ALKPHOS 111  --   --    AST 32  --   --    ALT 64*  --   --    ANIONGAP 12 8 8   ESTGFRAFRICA 59.7* >60.0 >60.0   EGFRNONAA 51.7* >60.0 >60.0    and CBC:   Recent Labs  Lab  01/10/18  1120   WBC 9.10   HGB 17.6   HCT 52.7          Significant Imaging: Echocardiogram:   2D echo with color flow doppler:   Results for orders placed or performed during the hospital encounter of 01/10/18   2D echo with color flow doppler   Result Value Ref Range    EF 65 55 - 65    Diastolic Dysfunction No      Assessment and Plan:     * SVT (supraventricular tachycardia)     - Short RP tachycardia   - Successful ablation of right anterolateral concealed accessory pathway.  - Can cont dilt 120mg XR and aspirin 81 mg x 4 weeks  - Ok to discharge home, Follow up with primary cardiologist in [place]                 Francisco Prajapati MD  Cardiac Electrophysiology  [facility]

## 2018-01-14 NOTE — DISCHARGE SUMMARY
[facility] Hospital Medicine  Discharge Summary      Patient Name: Reyna Drummond  MRN: [id number]  Admission Date: 1/10/2018  Hospital Length of Stay: 0 days  Discharge Date and Time: 1/12/2018 10:59 AM  Attending Physician: Dr. Ella Nobles   Discharging Provider: Berta Ghotra NP  Primary Care Provider: Primary Doctor [facility] Medicine Team: [facility] MED J Berta Ghotra NP    HPI:   58 y/o male with PMH of HTN, Anxiety, HLD and SVT for 3 years occuring about once a month to once every few months, he lives primarily in [place] and was offered an ablation in the past.  While visiting family here in [place], he's had three separate episodes of SVT which have brought him to the ED, each time he's successfully converted with Adenosine IV either 6/12 mg dosage. He was previously sent out on 12.5mg Toprol BID in addition to Home dilt 120mg XR which he has been taking for some time. He was having URI symptoms and had been taking cold medications which were thought to have precipitated event so he stopped taking them but had another episode today. 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 SBP was in 70's with HR in 200's and he spilled some troponin. No CP. Very active and doesn't get CP or SOB when playing soccer or softball.      Procedure(s) (LRB):  ABLATION (N/A)      Hospital Course:   Admitted to hospital medicine for recurrent SVT and need for AVN ablation.  He is from [place] and will be following up with his EP there.  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  He will need to take ASA 81 mg po qd for 4 weeks and continue his cardizem but d/c his BB.      Dispo: home to f/u with primary EP in [place], he's due to fly back soon.      Consults:   Consults         Status Ordering Provider     Inpatient consult to Electrophysiology  Once     Provider:  (Not yet assigned)    Completed BERTA GHOTRA       Review of Systems   Constitutional: Negative for activity change, appetite change, chills, fatigue and fever.   HENT: Negative for congestion, rhinorrhea, sinus pressure, sore throat and trouble swallowing.    Eyes: Negative for pain, redness and visual disturbance.   Respiratory: Negative for cough, chest tightness, shortness of breath, wheezing and stridor.    Cardiovascular: Negative for chest pain, palpitations and leg swelling.   Gastrointestinal: Negative for abdominal distention, abdominal pain, blood in stool, constipation, diarrhea, nausea and vomiting.   Endocrine: Negative for cold intolerance and heat intolerance.   Genitourinary: Negative for dysuria, enuresis, frequency, hematuria and urgency.   Musculoskeletal: Negative for arthralgias, back pain, myalgias and neck pain.   Skin: Negative for color change, pallor and rash.   Allergic/Immunologic: Negative for immunocompromised state.   Neurological: Negative for dizziness, tremors, syncope, weakness, light-headedness, numbness and headaches.   Hematological: Does not bruise/bleed easily.   Psychiatric/Behavioral: Negative for agitation and confusion. The patient is not nervous/anxious.        Physical Exam   Constitutional: He is oriented to person, place, and time. He appears well-developed and well-nourished. No distress.   HENT:   Head: Normocephalic and atraumatic.   Right Ear: External ear normal.   Left Ear: External ear normal.   Nose: Nose normal.   Mouth/Throat: Oropharynx is clear and moist.   Eyes: Conjunctivae and EOM are normal. No scleral icterus.   Neck: Normal range of motion. Neck supple. No JVD present. No tracheal deviation present. No thyromegaly present.   Cardiovascular: Normal rate, regular rhythm, normal heart sounds and intact distal pulses.  Exam reveals no gallop and no friction rub.    No murmur heard.  Pulmonary/Chest: Effort normal and breath sounds normal. No respiratory distress. He has no wheezes. He has no rales.    Abdominal: Soft. Bowel sounds are normal. He exhibits no distension and no mass. There is no tenderness. There is no rebound and no guarding.   Musculoskeletal: Normal range of motion. He exhibits no edema or tenderness.   Neurological: He is alert and oriented to person, place, and time. No cranial nerve deficit or sensory deficit. He exhibits normal muscle tone. Coordination normal.   Skin: Skin is warm and dry. Capillary refill takes less than 2 seconds. No rash noted. No erythema.   Psychiatric: He has a normal mood and affect. His behavior is normal. Judgment and thought content normal.   Nursing note and vitals reviewed.    * SVT (supraventricular tachycardia)    - s/p Successful Ablation with Dr. Patel   - f/u with home EP in [place]            Acute renal failure    CR 1.5 on admit, unknown baseline, down to 0.9 day of discharge           Generalized anxiety disorder    - takes diazepam prn            Mixed hyperlipidemia    Takes crestor every other day for leg cramps  - lipid panel wnl, HDL low at 24          Essential hypertension    Cardizem continue d/c metoprolol, b/p 124/79 am of discharge  - sbp goal < 130/80            Final Active Diagnoses:    Diagnosis Date Noted POA    PRINCIPAL PROBLEM:  SVT (supraventricular tachycardia) [I47.1] 01/10/2018 Yes    Acute renal failure [N17.9] 01/10/2018 Yes    Generalized anxiety disorder [F41.1] 01/10/2018 Yes     Chronic    Essential hypertension [I10] 01/10/2018 Yes    Mixed hyperlipidemia [E78.2] 01/10/2018 Yes     Chronic      Problems Resolved During this Admission:    Diagnosis Date Noted Date Resolved POA       Discharged Condition: good    Disposition: Home or Self Care    Follow Up:    Patient Instructions:     Diet Adult Regular   Order Specific Question Answer Comments   Na restriction, if any: 3gNa      Activity as tolerated     Notify your health care provider if you experience any of the following:  temperature >100.4     Notify your  health care provider if you experience any of the following:  persistent nausea and vomiting or diarrhea     Notify your health care provider if you experience any of the following:  severe uncontrolled pain     Notify your health care provider if you experience any of the following:  redness, tenderness, or signs of infection (pain, swelling, redness, odor or green/yellow discharge around incision site)     Notify your health care provider if you experience any of the following:  difficulty breathing or increased cough     Notify your health care provider if you experience any of the following:  severe persistent headache     Notify your health care provider if you experience any of the following:  worsening rash     Notify your health care provider if you experience any of the following:  persistent dizziness, light-headedness, or visual disturbances     Notify your health care provider if you experience any of the following:  increased confusion or weakness         Significant Diagnostic Studies: Labs:   BMP:   Recent Labs  Lab 01/12/18  0402   GLU 86      K 4.0      CO2 25   BUN 16   CREATININE 0.9   CALCIUM 8.9   MG 1.8   ,  INR   Lab Results   Component Value Date    INR 1.1 01/10/2018   , Lipid Panel   Lab Results   Component Value Date    CHOL 122 01/11/2018    HDL 24 (L) 01/11/2018    LDLCALC 77.8 01/11/2018    TRIG 101 01/11/2018    CHOLHDL 19.7 (L) 01/11/2018   , Troponin   Recent Labs  Lab 01/11/18  0350   TROPONINI 0.069*        Pending Diagnostic Studies:     None         Medications:  Reconciled Home Medications:   Discharge Medication List as of 1/12/2018  9:39 AM      CONTINUE these medications which have CHANGED    Details   aspirin (ECOTRIN) 81 MG EC tablet Take 1 tablet (81 mg total) by mouth once daily. 4 weeks post procedure, and then stop unless otherwise directed by MD, Starting Fri 1/12/2018, OTC         CONTINUE these medications which have NOT CHANGED    Details    Bifidobacterium infantis (ALIGN) 4 mg Cap Take 1 capsule by mouth once daily., Historical Med      diazePAM (VALIUM) 5 MG tablet Take 2.5 mg by mouth daily as needed for Anxiety., Historical Med      diltiaZEM (DILACOR XR) 120 MG CDCR Take 120 mg by mouth once daily., Historical Med      promethazine (PHENERGAN) 25 MG tablet Take 25 mg by mouth daily as needed (allergies)., Historical Med      rosuvastatin (CRESTOR) 5 MG tablet Take 5 mg by mouth once daily. Taking every other day, Historical Med      simethicone (MYLICON) 125 MG chewable tablet Take 125 mg by mouth every 6 (six) hours as needed for Flatulence., Historical Med      vitamin D 1000 units Tab Take 5,000 Units by mouth every other day., Historical Med         STOP taking these medications       metoprolol succinate (TOPROL XL) 25 MG 24 hr tablet Comments:   Reason for Stopping:               Indwelling Lines/Drains at time of discharge:   Lines/Drains/Airways          No matching active lines, drains, or airways          Time spent on the discharge of patient: 36 minutes  Patient was seen and examined on the date of discharge and determined to be suitable for discharge.         Berta Iraheta NP  Department of Hospital Medicine  [facility]  Spectra:  [phone]  Pager: [phone]

## 2018-01-15 NOTE — PLAN OF CARE
01/15/18 0712   Final Note   Assessment Type Final Discharge Note   Discharge Disposition Home   Hospital Follow Up  Appt(s) scheduled? Yes

## 2018-01-16 NOTE — TRANSFER OF CARE
"Anesthesia Transfer of Care Note    Patient: Reyna Drummond    Procedure(s) Performed: Procedure(s) (LRB):  ABLATION (N/A)    Patient location: PACU    Anesthesia Type: general    Transport from OR: Transported from OR on 2-3 L/min O2 by NC with adequate spontaneous ventilation    Post pain: adequate analgesia    Post assessment: no apparent anesthetic complications    Post vital signs: stable    Level of consciousness: awake    Nausea/Vomiting: no nausea/vomiting    Complications: none    Transfer of care protocol was followed      Last vitals:   Visit Vitals  /79 (BP Location: Right arm, Patient Position: Lying)   Pulse 76   Temp 36.9 °C (98.4 °F) (Oral)   Resp 16   Ht 5' 11" (1.803 m)   Wt 92.4 kg (203 lb 11.3 oz)   SpO2 (!) 93%   BMI 28.41 kg/m²     "

## 2021-08-17 NOTE — NURSING TRANSFER
Nursing Transfer Note      1/11/2018     Transfer To: 351 From PACU    Transfer via stretcher    Transfer with cardiac monitoring    Transported by PCT    Medicines sent: yes    Chart send with patient: Yes    Notified: spouse    Patient reassessed at: 1/11/18 @ 2096    Upon arrival to floor:   
7

## 2025-05-28 NOTE — ED PROVIDER NOTES
Copied from CRM #[id number]. Topic: MW Clinical Concerns - MW Routine RN Triage  >> May 28, 2025  8:55 AM Felisha CARLSON wrote:  --DO NOT REPLY - Sent from PACT - If sent to wrong pool, reroute to P ECO Reroute pool --    Patient Name: Michelle Parikh  Specialist or PCP Name: narendra umanzor  Symptoms: UTI symptoms hard to urinate pain 6/10 asking if she can bring in a urine sample  Pregnant (females aged 13-60. If Yes, how long?) : n/a  Call Back # : [phone]  Can a detailed message be left? Yes - Voicemail        Encounter Date: 1/5/2018       History     Chief Complaint   Patient presents with    Tachycardia     Pt reports he was seen here earlier for SVT's, converted and discharged and now he thinks he is tachycardic again. Current , reports anxiety and belching as well. HR suddenly increased to 230's during triage, irregular rate and rhythm. Charge notified.      58y/o M w/ hx SVT 2nd abnormal conduction pathway presents in SVT and palpitations. Denies other symptoms except for racing heart beat. Feels that it is due to recent decongestant use; was here earlier today for similar symptoms. Follows w/ cardiology in [place] and is in process of been evaluated for ablation. Procedure not yet scheduled. No other acute changes, compliant w/ medications.        Palpitations    This is a recurrent problem. The current episode started just prior to arrival. The problem occurs intermittently. The problem has been unchanged. Pertinent negatives include no fever, no chest pain, no abdominal pain, no vomiting, no headaches and no shortness of breath. He has tried nothing for the symptoms. Risk factors: known EP cause.     Review of patient's allergies indicates:   Allergen Reactions    Soma [carisoprodol]      Past Medical History:   Diagnosis Date    Anxiety     Hyperlipidemia     Hypertension     SVT (supraventricular tachycardia)      History reviewed. No pertinent surgical history.  History reviewed. No pertinent family history.  Social History   Substance Use Topics    Smoking status: Never Smoker    Smokeless tobacco: Not on file    Alcohol use No     Review of Systems   Constitutional: Negative for fever.   HENT: Negative for trouble swallowing.    Eyes: Negative for visual disturbance.   Respiratory: Negative for shortness of breath.         Recent URI symptoms improving   Cardiovascular: Positive for palpitations. Negative for chest pain.   Gastrointestinal: Negative for abdominal pain and vomiting.    Genitourinary: Negative for dysuria.   Musculoskeletal: Negative for arthralgias and myalgias.   Skin: Negative for rash.   Allergic/Immunologic: Negative for immunocompromised state.   Neurological: Negative for headaches.   Hematological: Does not bruise/bleed easily.       Physical Exam     Initial Vitals [01/05/18 2131]   BP Pulse Resp Temp SpO2   (!) 119/59 (!) 221 (!) 22 100.3 °F (37.9 °C) 98 %      MAP       79         Physical Exam    Nursing note and vitals reviewed.  Constitutional: He appears well-developed and well-nourished. He is not diaphoretic. No distress.   HENT:   Head: Normocephalic and atraumatic.   Eyes: Conjunctivae and EOM are normal. Pupils are equal, round, and reactive to light.   Neck: Normal range of motion.   Cardiovascular:   Tachycardic, palpable radial pulses irregular and appears every other beat   Pulmonary/Chest: Breath sounds normal. No respiratory distress.   Abdominal: Soft. He exhibits no distension. There is no tenderness.   Musculoskeletal: Normal range of motion. He exhibits no edema or tenderness.   Neurological: He is alert.   Skin: Skin is warm and dry.         ED Course   Procedures  Labs Reviewed - No data to display  EKG Readings: (Independently Interpreted)   EKG: SVT at a rate of 220          Medical Decision Making:   History:   Old Medical Records: I decided to obtain old medical records.  Old Records Summarized: records from previous admission(s).       <> Summary of Records: Patient was seen earlier today for SVT  Clinical Tests:   Lab Tests: Reviewed  Medical Tests: Reviewed  ED Management:  Cardiology  Other:   I have discussed this case with another health care provider.       APC / Resident Notes:   PGY-4 MDM: Stable patient w/ repeat episode of SVT and known history, likely triggered by decongestant use. Initially tried modified valsalva x2 w/o improvement, received adenosine 6mg x1 that resulted in sinus tachycardia borderline at 100 and resolution of  SVT. Observed w/o complication. Patient plans to f/u w/ his EP physician in [place] for ablation; discussed with cardiology who recommends metoprolol 12.5 bid until then. After discharge noted that prescription here erroneously written for 12.5 daily; will call patient and call in correct prescription. Pt stable for discharge w/ f/u EP physician, close ED return precautions given.    Nicki Ramirez MD  PGY-4 EM 2 AM 1/6/2018      Care Update: Reached patient, clarified prescription. Updated prescription called in to his pharmacy at [facility].    Nicki Ramirez MD  PGY-4 EM 4:16 PM 1/6/2018             Attending Attestation:   Physician Attestation Statement for Resident:  As the supervising MD   Physician Attestation Statement: I have personally seen and examined this patient.   I agree with the above history. -:   As the supervising MD I agree with the above PE.    As the supervising MD I agree with the above treatment, course, plan, and disposition.   -: Emergent evaluation a 57-year-old male presenting with recurrent SVT.  Patient seen immediately on arrival placement cardiac monitor.  IV line established.  Patient was treated with adenosine 6 mg IV sustained SVT.  Second dose of 12 mg of adenosine administered.  This terminated patient's arrhythmia.  Case discussed with cardiology who is recommending follow up with EP.  Patient states he is from [place] and would prefer to follow up with EP there as he is leaving on Tuesday.  Clinical impression and plan discussed with patient.    Pt is to call for follow up with cardiology in 3 days.  Pt to return to the ED for any new or concerning symptoms.  Aftercare instructions and return precautions provided to patient.   Pt expressed understanding and agrees with plan.          Attending Critical Care:   Critical Care Times:   ==============================================================  · Total Critical Care Time - exclusive of procedural time: 35  minutes.  ==============================================================  Critical care was necessary to treat or prevent imminent or life-threatening deterioration of the following conditions: cardiac arrhythmia.   Critical care was time spent personally by me on the following activities: obtaining history from patient or relative, review of x-rays / CT sent with the patient, review of old charts, ordering lab, x-rays, and/or EKG, discussion with consultants and re-evaluation of patient's conition.   Critical Care Condition: potentially life-threatening               ED Course      Clinical Impression:   The encounter diagnosis was SVT (supraventricular tachycardia).    Disposition:   Disposition: Discharged  Condition: Fair                        Nicki Ramirez MD  Resident  01/06/18 0657       Nicki Ramirez MD  Resident  01/06/18 8177       Jessica Oliver MD  01/12/18 8248